# Patient Record
Sex: FEMALE | Race: WHITE | NOT HISPANIC OR LATINO | Employment: OTHER | URBAN - METROPOLITAN AREA
[De-identification: names, ages, dates, MRNs, and addresses within clinical notes are randomized per-mention and may not be internally consistent; named-entity substitution may affect disease eponyms.]

---

## 2017-01-16 ENCOUNTER — ALLSCRIPTS OFFICE VISIT (OUTPATIENT)
Dept: OTHER | Facility: OTHER | Age: 62
End: 2017-01-16

## 2017-04-03 ENCOUNTER — ALLSCRIPTS OFFICE VISIT (OUTPATIENT)
Dept: OTHER | Facility: OTHER | Age: 62
End: 2017-04-03

## 2017-04-07 ENCOUNTER — TRANSCRIBE ORDERS (OUTPATIENT)
Dept: ADMINISTRATIVE | Facility: HOSPITAL | Age: 62
End: 2017-04-07

## 2017-04-07 DIAGNOSIS — Z12.31 ENCOUNTER FOR SCREENING MAMMOGRAM FOR MALIGNANT NEOPLASM OF BREAST: Primary | ICD-10-CM

## 2017-04-24 ENCOUNTER — ALLSCRIPTS OFFICE VISIT (OUTPATIENT)
Dept: OTHER | Facility: OTHER | Age: 62
End: 2017-04-24

## 2017-05-09 ENCOUNTER — HOSPITAL ENCOUNTER (OUTPATIENT)
Dept: MAMMOGRAPHY | Facility: MEDICAL CENTER | Age: 62
Discharge: HOME | End: 2017-05-09
Payer: COMMERCIAL

## 2017-05-09 DIAGNOSIS — Z12.31 ENCOUNTER FOR SCREENING MAMMOGRAM FOR MALIGNANT NEOPLASM OF BREAST: ICD-10-CM

## 2017-05-09 PROCEDURE — G0202 SCR MAMMO BI INCL CAD: HCPCS

## 2017-07-03 ENCOUNTER — ALLSCRIPTS OFFICE VISIT (OUTPATIENT)
Dept: OTHER | Facility: OTHER | Age: 62
End: 2017-07-03

## 2017-08-16 ENCOUNTER — GENERIC CONVERSION - ENCOUNTER (OUTPATIENT)
Dept: OTHER | Facility: OTHER | Age: 62
End: 2017-08-16

## 2017-09-21 ENCOUNTER — ALLSCRIPTS OFFICE VISIT (OUTPATIENT)
Dept: OTHER | Facility: OTHER | Age: 62
End: 2017-09-21

## 2017-09-21 DIAGNOSIS — K21.9 GASTRO-ESOPHAGEAL REFLUX DISEASE WITHOUT ESOPHAGITIS: ICD-10-CM

## 2017-09-21 DIAGNOSIS — Z13.220 ENCOUNTER FOR SCREENING FOR LIPOID DISORDERS: ICD-10-CM

## 2017-09-21 DIAGNOSIS — Z13.29 ENCOUNTER FOR SCREENING FOR OTHER SUSPECTED ENDOCRINE DISORDER: ICD-10-CM

## 2017-09-21 DIAGNOSIS — R10.9 ABDOMINAL PAIN: ICD-10-CM

## 2017-09-21 DIAGNOSIS — K58.9 IRRITABLE BOWEL SYNDROME WITHOUT DIARRHEA: ICD-10-CM

## 2017-09-21 DIAGNOSIS — F41.8 OTHER SPECIFIED ANXIETY DISORDERS: ICD-10-CM

## 2017-09-22 ENCOUNTER — HOSPITAL ENCOUNTER (OUTPATIENT)
Dept: ULTRASOUND IMAGING | Facility: HOSPITAL | Age: 62
Discharge: HOME | End: 2017-09-22
Payer: COMMERCIAL

## 2017-09-22 ENCOUNTER — APPOINTMENT (OUTPATIENT)
Dept: LAB | Facility: CLINIC | Age: 62
End: 2017-09-22
Payer: COMMERCIAL

## 2017-09-22 ENCOUNTER — TRANSCRIBE ORDERS (OUTPATIENT)
Dept: LAB | Facility: CLINIC | Age: 62
End: 2017-09-22

## 2017-09-22 DIAGNOSIS — R10.9 ABDOMINAL PAIN: ICD-10-CM

## 2017-09-22 DIAGNOSIS — Z13.29 ENCOUNTER FOR SCREENING FOR OTHER SUSPECTED ENDOCRINE DISORDER: ICD-10-CM

## 2017-09-22 DIAGNOSIS — F41.8 OTHER SPECIFIED ANXIETY DISORDERS: ICD-10-CM

## 2017-09-22 DIAGNOSIS — K58.9 IRRITABLE BOWEL SYNDROME WITHOUT DIARRHEA: ICD-10-CM

## 2017-09-22 DIAGNOSIS — K21.9 GASTRO-ESOPHAGEAL REFLUX DISEASE WITHOUT ESOPHAGITIS: ICD-10-CM

## 2017-09-22 DIAGNOSIS — Z13.220 ENCOUNTER FOR SCREENING FOR LIPOID DISORDERS: ICD-10-CM

## 2017-09-22 LAB
ALBUMIN SERPL BCP-MCNC: 3.6 G/DL (ref 3.5–5)
ALP SERPL-CCNC: 80 U/L (ref 46–116)
ALT SERPL W P-5'-P-CCNC: 25 U/L (ref 12–78)
AMYLASE SERPL-CCNC: 60 IU/L (ref 25–115)
ANION GAP SERPL CALCULATED.3IONS-SCNC: 7 MMOL/L (ref 4–13)
AST SERPL W P-5'-P-CCNC: 21 U/L (ref 5–45)
BILIRUB SERPL-MCNC: 0.6 MG/DL (ref 0.2–1)
BILIRUB UR QL STRIP: NEGATIVE
BUN SERPL-MCNC: 14 MG/DL (ref 5–25)
CALCIUM SERPL-MCNC: 8.8 MG/DL (ref 8.3–10.1)
CHLORIDE SERPL-SCNC: 102 MMOL/L (ref 100–108)
CHOLEST SERPL-MCNC: 159 MG/DL (ref 50–200)
CLARITY UR: CLEAR
CO2 SERPL-SCNC: 28 MMOL/L (ref 21–32)
COLOR UR: YELLOW
CREAT SERPL-MCNC: 0.82 MG/DL (ref 0.6–1.3)
ERYTHROCYTE [DISTWIDTH] IN BLOOD BY AUTOMATED COUNT: 13.7 % (ref 11.6–15.1)
GFR SERPL CREATININE-BSD FRML MDRD: 77 ML/MIN/1.73SQ M
GLUCOSE P FAST SERPL-MCNC: 96 MG/DL (ref 65–99)
GLUCOSE UR STRIP-MCNC: NEGATIVE MG/DL
HCT VFR BLD AUTO: 41.6 % (ref 34.8–46.1)
HDLC SERPL-MCNC: 71 MG/DL (ref 40–60)
HGB BLD-MCNC: 14 G/DL (ref 11.5–15.4)
HGB UR QL STRIP.AUTO: NEGATIVE
KETONES UR STRIP-MCNC: NEGATIVE MG/DL
LDLC SERPL CALC-MCNC: 66 MG/DL (ref 0–100)
LEUKOCYTE ESTERASE UR QL STRIP: NEGATIVE
LIPASE SERPL-CCNC: 162 U/L (ref 73–393)
MCH RBC QN AUTO: 32.6 PG (ref 26.8–34.3)
MCHC RBC AUTO-ENTMCNC: 33.7 G/DL (ref 31.4–37.4)
MCV RBC AUTO: 97 FL (ref 82–98)
NITRITE UR QL STRIP: NEGATIVE
PH UR STRIP.AUTO: 6.5 [PH] (ref 4.5–8)
PLATELET # BLD AUTO: 277 THOUSANDS/UL (ref 149–390)
PMV BLD AUTO: 10.8 FL (ref 8.9–12.7)
POTASSIUM SERPL-SCNC: 4 MMOL/L (ref 3.5–5.3)
PROT SERPL-MCNC: 7.2 G/DL (ref 6.4–8.2)
PROT UR STRIP-MCNC: NEGATIVE MG/DL
RBC # BLD AUTO: 4.29 MILLION/UL (ref 3.81–5.12)
SODIUM SERPL-SCNC: 137 MMOL/L (ref 136–145)
SP GR UR STRIP.AUTO: 1.01 (ref 1–1.03)
TRIGL SERPL-MCNC: 110 MG/DL
TSH SERPL DL<=0.05 MIU/L-ACNC: 2.21 UIU/ML (ref 0.36–3.74)
UROBILINOGEN UR QL STRIP.AUTO: 0.2 E.U./DL
WBC # BLD AUTO: 6.74 THOUSAND/UL (ref 4.31–10.16)

## 2017-09-22 PROCEDURE — 85027 COMPLETE CBC AUTOMATED: CPT

## 2017-09-22 PROCEDURE — 80053 COMPREHEN METABOLIC PANEL: CPT

## 2017-09-22 PROCEDURE — 81003 URINALYSIS AUTO W/O SCOPE: CPT

## 2017-09-22 PROCEDURE — 76700 US EXAM ABDOM COMPLETE: CPT

## 2017-09-22 PROCEDURE — 82150 ASSAY OF AMYLASE: CPT

## 2017-09-22 PROCEDURE — 36415 COLL VENOUS BLD VENIPUNCTURE: CPT

## 2017-09-22 PROCEDURE — 80061 LIPID PANEL: CPT

## 2017-09-22 PROCEDURE — 84443 ASSAY THYROID STIM HORMONE: CPT

## 2017-09-22 PROCEDURE — 83690 ASSAY OF LIPASE: CPT

## 2017-09-23 ENCOUNTER — GENERIC CONVERSION - ENCOUNTER (OUTPATIENT)
Dept: OTHER | Facility: OTHER | Age: 62
End: 2017-09-23

## 2017-09-25 ENCOUNTER — APPOINTMENT (OUTPATIENT)
Dept: LAB | Facility: CLINIC | Age: 62
End: 2017-09-25
Payer: COMMERCIAL

## 2017-09-25 ENCOUNTER — GENERIC CONVERSION - ENCOUNTER (OUTPATIENT)
Dept: OTHER | Facility: OTHER | Age: 62
End: 2017-09-25

## 2017-09-25 DIAGNOSIS — F41.8 OTHER SPECIFIED ANXIETY DISORDERS: ICD-10-CM

## 2017-09-25 DIAGNOSIS — K21.9 GASTRO-ESOPHAGEAL REFLUX DISEASE WITHOUT ESOPHAGITIS: ICD-10-CM

## 2017-09-25 DIAGNOSIS — K58.9 IRRITABLE BOWEL SYNDROME WITHOUT DIARRHEA: ICD-10-CM

## 2017-09-25 DIAGNOSIS — Z13.29 ENCOUNTER FOR SCREENING FOR OTHER SUSPECTED ENDOCRINE DISORDER: ICD-10-CM

## 2017-09-25 DIAGNOSIS — Z13.220 ENCOUNTER FOR SCREENING FOR LIPOID DISORDERS: ICD-10-CM

## 2017-09-25 DIAGNOSIS — R10.9 ABDOMINAL PAIN: ICD-10-CM

## 2017-09-25 LAB — HEMOCCULT STL QL IA: NEGATIVE

## 2017-09-25 PROCEDURE — G0328 FECAL BLOOD SCRN IMMUNOASSAY: HCPCS

## 2017-09-26 ENCOUNTER — GENERIC CONVERSION - ENCOUNTER (OUTPATIENT)
Dept: OTHER | Facility: OTHER | Age: 62
End: 2017-09-26

## 2017-09-28 ENCOUNTER — GENERIC CONVERSION - ENCOUNTER (OUTPATIENT)
Dept: OTHER | Facility: OTHER | Age: 62
End: 2017-09-28

## 2017-09-30 ENCOUNTER — TRANSCRIBE ORDERS (OUTPATIENT)
Dept: ADMINISTRATIVE | Facility: HOSPITAL | Age: 62
End: 2017-09-30

## 2017-09-30 DIAGNOSIS — R10.9 ABDOMINAL PAIN, UNSPECIFIED SITE: Primary | ICD-10-CM

## 2017-10-01 DIAGNOSIS — K21.9 GASTRO-ESOPHAGEAL REFLUX DISEASE WITHOUT ESOPHAGITIS: ICD-10-CM

## 2017-10-01 DIAGNOSIS — F41.8 OTHER SPECIFIED ANXIETY DISORDERS: ICD-10-CM

## 2017-10-09 ENCOUNTER — ALLSCRIPTS OFFICE VISIT (OUTPATIENT)
Dept: OTHER | Facility: OTHER | Age: 62
End: 2017-10-09

## 2017-10-13 ENCOUNTER — HOSPITAL ENCOUNTER (OUTPATIENT)
Dept: NUCLEAR MEDICINE | Facility: HOSPITAL | Age: 62
Discharge: HOME | End: 2017-10-13
Payer: COMMERCIAL

## 2017-10-13 DIAGNOSIS — R10.9 ABDOMINAL PAIN: ICD-10-CM

## 2017-10-13 PROCEDURE — A9537 TC99M MEBROFENIN: HCPCS

## 2017-10-13 PROCEDURE — 78227 HEPATOBIL SYST IMAGE W/DRUG: CPT

## 2017-10-13 RX ADMIN — SINCALIDE 1.7 MCG: 5 INJECTION, POWDER, LYOPHILIZED, FOR SOLUTION INTRAVENOUS at 09:00

## 2017-10-15 ENCOUNTER — GENERIC CONVERSION - ENCOUNTER (OUTPATIENT)
Dept: OTHER | Facility: OTHER | Age: 62
End: 2017-10-15

## 2017-10-18 ENCOUNTER — GENERIC CONVERSION - ENCOUNTER (OUTPATIENT)
Dept: OTHER | Facility: OTHER | Age: 62
End: 2017-10-18

## 2017-10-25 ENCOUNTER — GENERIC CONVERSION - ENCOUNTER (OUTPATIENT)
Dept: OTHER | Facility: OTHER | Age: 62
End: 2017-10-25

## 2017-10-28 NOTE — PROGRESS NOTES
Assessment    1  Depression with anxiety (300 4) (F41 8)   2  GERD (gastroesophageal reflux disease) (530 81) (K21 9)   3  Abdominal pain (789 00) (R10 9)    Discussion/Summary    #1  Depression with anxiety-improved on citalopram 10 mg 1 tablet daily  Chronic abdominal pain-patient to see Gastroenterology to get an EGD and a colonoscopy done  She has been on long-term omeprazole without much relief  GERD-to see GI to get rid of her abdominal pain  will follow up after she gets her testing done  Possible side effects of new medications were reviewed with the patient/guardian today  The treatment plan was reviewed with the patient/guardian  The patient/guardian understands and agrees with the treatment plan     Self Referrals: No      Chief Complaint  Follow up depression/anxiety, IBS, abdominal painBW results - [facility]      History of Present Illness  This is a 80-year-old female who comes in for her regular check  Since starting citalopram 10 mg 1 daily her anxiety and depression is not bad at all and she does not need a boost in the dosage  Her blood pressure today is 108/70 and her weight is up 1 lb from the previous visit to 184 lb  Reviewing her labs her hemoccults are negative, amylase and lipase are negative, CBC urine and thyroid are all negative  Her cholesterol panel is at goal with the LDL 66 and her glucose is 96  She still has a HIDA scan to undertake  She sees her gastroenterologist for an EGD and colonoscopy in the near future  Review of Systems   Constitutional: No fever, no chills, feels well, no tiredness, no recent weight gain or weight loss  ENT: no complaints of earache, no loss of hearing, no nose bleeds, no nasal discharge, no sore throat, no hoarseness  Cardiovascular: No complaints of slow heart rate, no fast heart rate, no chest pain, no palpitations, no leg claudication, no lower extremity edema    Respiratory: No complaints of shortness of breath, no wheezing, no cough, no SOB on exertion, no orthopnea, no PND  Gastrointestinal: nausea,-- constipation-- and-- Chronic abdominal pain, but-- as noted in HPI,-- no vomiting,-- no diarrhea-- and-- no blood in stools--   The patient presents with complaints of gradual onset of constant episodes of moderate bilateral upper quadrant and bilateral lower quadrant abdominal pain, described as aching, non-radiating  Genitourinary: No complaints of dysuria, no incontinence, no pelvic pain, no dysmenorrhea, no vaginal discharge or bleeding  ROS reviewed  Active Problems  1  Abdominal pain (789 00) (R10 9)   2  Depression screening (V79 0) (Z13 89)   3  Depression with anxiety (300 4) (F41 8)   4  GERD (gastroesophageal reflux disease) (530 81) (K21 9)   5  IBS (irritable bowel syndrome) (564 1) (K58 9)   6  Neck muscle spasm (728 85) (M62 838)   7  Screening for heart disease (V81 2) (Z13 6)   8  Screening for lipid disorders (V77 91) (Z13 220)   9  Screening for thyroid disorder (V77 0) (Z13 29)   10  Vertigo (780 4) (R42)   11  Visit for routine gyn exam (V72 31) (Z01 419)    Past Medical History  1  History of Vitamin D deficiency (268 9) (E55 9)    The active problems and past medical history were reviewed and updated today  Surgical History    1  History of Breast Surgery Reduction Procedure   2  History of Hysterectomy   3  History of Laparoscopic Sling Operation For Stress Incontinence   4  History of Total Knee Replacement    The surgical history was reviewed and updated today  Family History  Mother    1  Family history of arthritis (V17 7) (Z82 61)   2  Family history of diabetes mellitus (V18 0) (Z83 3)   3  Family history of hypertension (V17 49) (Z82 49)   4  Family history of thyroid disease (V18 19) (Z83 49)  Father    5  Family history of arthritis (V17 7) (Z82 61)   6  Family history of lung disease (V19 8) (Z83 6)  Grandparent    7  Family history of malignant neoplasm of stomach (V16 0) (Z80 0)  Grandfather    8  Family history of myocardial infarction (V17 3) (Z82 49)  Family History    9  No family history of mental disorder    The family history was reviewed and updated today  Social History     ·    · Never a smoker   · No drug use   · No secondhand smoke exposure (V49 89) (Z78 9)   · Occasional alcohol use   · Recreation: Crafts   · Recreation: Sewing, needlework  The social history was reviewed and updated today  The social history was reviewed and is unchanged  Current Meds   1  Citalopram Hydrobromide 10 MG Oral Tablet; take 1 tablet by mouth daily; Therapy: 86ZXH3635 to (Evaluate:11Nov2017)  Requested for: 19RSY6821; Last Rx:31Nfb0020 Ordered   2  Magnesium 250 MG Oral Tablet; Therapy: (Recorded:09Oct2017) to Recorded   3  Omeprazole 40 MG Oral Capsule Delayed Release; TAKE 1 CAPSULE BY MOUTH ONCE DAILY Recorded   4  Zostavax 64791 UNT/0 65ML Subcutaneous Solution Reconstituted; Please administer to patient; Therapy: 95YXC4936 to (Last Rx:[id number]) Ordered    The medication list was reviewed and updated today  Allergies  1  Reglan TABS    Vitals  Vital Signs    Recorded: 64BZK9659 04:35PM   Heart Rate 68, L Radial   Pulse Quality Regular, L Radial   Systolic 539, LUE, Sitting   Diastolic 70, LUE, Sitting   Height 5 ft 5 in   Weight 184 lb    BMI Calculated 30 62   BSA Calculated 1 91       Physical Exam   Constitutional  General appearance: No acute distress, well appearing and well nourished  Ears, Nose, Mouth, and Throat  External inspection of ears and nose: Normal    Otoscopic examination: Tympanic membranes translucent with normal light reflex  Canals patent without erythema  Oropharynx: Normal with no erythema, edema, exudate or lesions  Pulmonary  Auscultation of lungs: Clear to auscultation  Cardiovascular  Auscultation of heart: Normal rate and rhythm, normal S1 and S2, without murmurs     Examination of extremities for edema and/or varicosities: Normal    Abdomen  Abdomen: Abnormal  -- Mild tenderness in all quadrants with no guarding or rebound  Liver and spleen: No hepatomegaly or splenomegaly  Lymphatic  Palpation of lymph nodes in neck: No lymphadenopathy  Psychiatric  Orientation to person, place, and time: Normal    Mood and affect: Normal          Signatures   Electronically signed by : Angelique Strickland [facility]; Oct  9 2017  5:00PM EST                       (Author)    Electronically signed by :  JN Mark ; Oct  9 2017  5:29PM EST

## 2017-11-10 ENCOUNTER — LAB REQUISITION (OUTPATIENT)
Dept: LAB | Facility: HOSPITAL | Age: 62
End: 2017-11-10
Payer: COMMERCIAL

## 2017-11-10 ENCOUNTER — ALLSCRIPTS OFFICE VISIT (OUTPATIENT)
Dept: OTHER | Facility: OTHER | Age: 62
End: 2017-11-10

## 2017-11-10 DIAGNOSIS — R10.9 ABDOMINAL PAIN: ICD-10-CM

## 2017-11-10 DIAGNOSIS — R39.15 URGENCY OF URINATION: ICD-10-CM

## 2017-11-10 DIAGNOSIS — R35.0 FREQUENCY OF MICTURITION: ICD-10-CM

## 2017-11-10 LAB
BILIRUB UR QL STRIP: NEGATIVE
CLARITY UR: NORMAL
COLOR UR: YELLOW
GLUCOSE (HISTORICAL): NEGATIVE
HGB UR QL STRIP.AUTO: NEGATIVE
KETONES UR STRIP-MCNC: NEGATIVE MG/DL
LEUKOCYTE ESTERASE UR QL STRIP: NORMAL
NITRITE UR QL STRIP: NEGATIVE
PH UR STRIP.AUTO: 6 [PH]
PROT UR STRIP-MCNC: NEGATIVE MG/DL
SP GR UR STRIP.AUTO: 1.01
UROBILINOGEN UR QL STRIP.AUTO: 0.2

## 2017-11-10 PROCEDURE — 87186 SC STD MICRODIL/AGAR DIL: CPT | Performed by: FAMILY MEDICINE

## 2017-11-10 PROCEDURE — 87077 CULTURE AEROBIC IDENTIFY: CPT | Performed by: FAMILY MEDICINE

## 2017-11-10 PROCEDURE — 87086 URINE CULTURE/COLONY COUNT: CPT | Performed by: FAMILY MEDICINE

## 2017-11-11 NOTE — PROGRESS NOTES
Assessment    1  Burning with urination (788 1) (R30 0)   2  Urinary urgency (788 63) (R39 15)   3  Urinary frequency (788 41) (R35 0)   4  Acute UTI (599 0) (N39 0)    Plan  Abdominal pain, Burning with urination, Urinary frequency, Urinary urgency    · Urine Dip Automated- POC; Status:Complete;   Done: 65SOY9927 10:47AM  Abdominal pain, Urinary frequency, Urinary urgency    · (1) URINE CULTURE; Source:Urine, Clean Catch; Status: In Progress - Specimen/DataCollected;   Done: 10LMI5423  Acute UTI    · Nitrofurantoin Monohyd Macro 100 MG Oral Capsule; TAKE 1 CAPSULE TWICEDAILY WITH MEALS    Discussion/Summary    #1  UTI-patient was placed on Macrobid 100 mg 1 tablet twice a day #20  Her urine was sent for culture  Burning upon urination  Urgency upon urination  Frequency of urination  to get over her infection  patient with results of urine culture  Possible side effects of new medications were reviewed with the patient/guardian today  The treatment plan was reviewed with the patient/guardian  The patient/guardian understands and agrees with the treatment plan     Self Referrals: No      Chief Complaint  Abdominal pain, urinary urgency / frequency that started 2 weeks ago  Notes pain after voiding  States symptoms started within week or two after having had colonoscopy  Also notes she has bladder sling x 6 - 7 years, most recently checked in April of this year  - [facility]      History of Present Illness  HPI: This is a 35-year-old female who comes in with lower abdominal pain, urgency and frequency for the past 2 weeks  She is also experiencing some pain after voiding  She has not noticed any hematuria or back pain  She noticed that the symptoms began approximately 1-2 weeks after her colonoscopy  Her blood pressure today is 110/70 and her temperature is 97 7Â°  Reviewing her urine this morning showed moderate amount of leukocytes and urine will be sent for culture and she will be placed on Macrobid        Review of Systems Constitutional: No fever, no chills, feels well, no tiredness, no recent weight gain or loss  Cardiovascular: no complaints of slow or fast heart rate, no chest pain, no palpitations, no leg claudication or lower extremity edema  Respiratory: no complaints of shortness of breath, no wheezing, no dyspnea on exertion, no orthopnea or PND  Gastrointestinal: no complaints of abdominal pain, no constipation, no nausea or diarrhea, no vomiting, no bloody stools  Genitourinary: as noted in HPI,-- no pelvic pain,-- no vaginal discharge,-- no incontinence,-- no dysmenorrhea-- and-- no unexplained vaginal bleeding--   The patient presents with complaints of gradual onset of intermittent episodes of moderate dysuria  ROS reviewed  Active Problems  1  Abdominal pain (789 00) (R10 9)   2  Depression screening (V79 0) (Z13 89)   3  Depression with anxiety (300 4) (F41 8)   4  GERD (gastroesophageal reflux disease) (530 81) (K21 9)   5  IBS (irritable bowel syndrome) (564 1) (K58 9)   6  Neck muscle spasm (728 85) (M62 838)   7  Screening for heart disease (V81 2) (Z13 6)   8  Screening for lipid disorders (V77 91) (Z13 220)   9  Screening for thyroid disorder (V77 0) (Z13 29)   10  Vertigo (780 4) (R42)   11  Visit for routine gyn exam (V72 31) (Z01 419)    Past Medical History    1  History of Vitamin D deficiency (268 9) (E55 9)  Active Problems And Past Medical History Reviewed: The active problems and past medical history were reviewed and updated today  Family History  Mother    1  Family history of arthritis (V17 7) (Z82 61)   2  Family history of diabetes mellitus (V18 0) (Z83 3)   3  Family history of hypertension (V17 49) (Z82 49)   4  Family history of thyroid disease (V18 19) (Z83 49)  Father    5  Family history of arthritis (V17 7) (Z82 61)   6  Family history of lung disease (V19 8) (Z83 6)  Grandparent    7  Family history of malignant neoplasm of stomach (V16 0) (Z80 0)  Grandfather    8  Family history of myocardial infarction (V17 3) (Z82 49)  Family History    9  No family history of mental disorder  Family History Reviewed: The family history was reviewed and updated today  Social History   ·    · Never a smoker   · No drug use   · No secondhand smoke exposure (V49 89) (Z78 9)   · Occasional alcohol use   · Recreation: Crafts   · Recreation: Sewing, needlework  The social history was reviewed and updated today  The social history was reviewed and is unchanged  Surgical History    1  History of Breast Surgery Reduction Procedure   2  History of Hysterectomy   3  History of Laparoscopic Sling Operation For Stress Incontinence   4  History of Total Knee Replacement  Surgical History Reviewed: The surgical history was reviewed and updated today  Current Meds   1  Citalopram Hydrobromide 10 MG Oral Tablet; take 1 tablet by mouth daily; Therapy: 92SXU2628 to (Evaluate:11Nov2017)  Requested for: 97AXI9512; Last Rx:87Voq3919 Ordered   2  Magnesium 250 MG Oral Tablet; Therapy: (Recorded:09Oct2017) to Recorded   3  Omeprazole 40 MG Oral Capsule Delayed Release; TAKE 1 CAPSULE BY MOUTH ONCE DAILY Recorded   4  Zostavax 99090 UNT/0 65ML Subcutaneous Solution Reconstituted; Please administer to patient; Therapy: 68TXC5751 to (Last Rx:88Pmv8969) Ordered    The medication list was reviewed and updated today  Allergies  1  Reglan TABS    Vitals   Recorded: 79QHF5409 10:34AM   Temperature 97 7 F, Oral   Heart Rate 66, L Radial   Pulse Quality Regular, L Radial   Systolic 229, LUE, Sitting   Diastolic 70, LUE, Sitting   BP CUFF SIZE Large   Height 5 ft 5 in   Weight 186 lb    BMI Calculated 30 95   BSA Calculated 1 92       Physical Exam   Constitutional  General appearance: No acute distress, well appearing and well nourished  Pulmonary  Auscultation of lungs: Clear to auscultation     Cardiovascular  Auscultation of heart: Normal rate and rhythm, normal S1 and S2, without murmurs  Examination of extremities for edema and/or varicosities: Normal    Abdomen  Abdomen: Abnormal  -- Mild tenderness to palpation in the lower abdomen over the bladder with no guarding or rebound  Negative CVA tenderness  Liver and spleen: No hepatomegaly or splenomegaly  Lymphatic  Palpation of lymph nodes in neck: No lymphadenopathy  Results/Data  Urine Dip Automated- POC 05SWX5511 10:47AM Per Avendano     Test Name Result Flag Reference   Color Yellow     Clarity Hazy     Leukocytes Moderate     Nitrite Negative     Blood Negative     Bilirubin Negative     Urobilinogen 0 2     Protein Negative     Ph 6 0     Specific Gravity 1 015     Ketone Negative     Glucose Negative           Signatures   Electronically signed by : Chema Garner, [facility]; Nov 10 2017 11:11AM EST                       (Author)    Electronically signed by :  JN Koroma ; Nov 10 2017 12:49PM EST

## 2017-11-12 LAB — BACTERIA UR CULT: ABNORMAL

## 2017-11-13 ENCOUNTER — GENERIC CONVERSION - ENCOUNTER (OUTPATIENT)
Dept: OTHER | Facility: OTHER | Age: 62
End: 2017-11-13

## 2017-11-27 ENCOUNTER — ALLSCRIPTS OFFICE VISIT (OUTPATIENT)
Dept: OTHER | Facility: OTHER | Age: 62
End: 2017-11-27

## 2017-11-27 ENCOUNTER — GENERIC CONVERSION - ENCOUNTER (OUTPATIENT)
Dept: OTHER | Facility: OTHER | Age: 62
End: 2017-11-27

## 2017-11-27 LAB
BILIRUB UR QL STRIP: NORMAL
CLARITY UR: NORMAL
COLOR UR: YELLOW
GLUCOSE (HISTORICAL): NORMAL
HGB UR QL STRIP.AUTO: NORMAL
KETONES UR STRIP-MCNC: NORMAL MG/DL
LEUKOCYTE ESTERASE UR QL STRIP: NORMAL
NITRITE UR QL STRIP: NORMAL
PH UR STRIP.AUTO: 6.5 [PH]
PROT UR STRIP-MCNC: NORMAL MG/DL
SP GR UR STRIP.AUTO: 1.02
UROBILINOGEN UR QL STRIP.AUTO: 0.2

## 2018-01-11 NOTE — RESULT NOTES
Verified Results  (1) OCCULT BLOOD, FECAL IMMUNOCHEMICAL TEST 45Juy5454 11:30AM Sergey List Order Number: [id number]     Test Name Result Flag Reference   OCCULT BLD, FECAL IMMUNOLOGICAL Negative  Negative   Performed by Fecal Immunochemical Test

## 2018-01-12 VITALS — SYSTOLIC BLOOD PRESSURE: 120 MMHG | WEIGHT: 183.38 LBS | DIASTOLIC BLOOD PRESSURE: 72 MMHG | TEMPERATURE: 97.9 F

## 2018-01-13 VITALS
DIASTOLIC BLOOD PRESSURE: 62 MMHG | HEART RATE: 84 BPM | HEIGHT: 65 IN | BODY MASS INDEX: 30.49 KG/M2 | SYSTOLIC BLOOD PRESSURE: 100 MMHG | WEIGHT: 183 LBS

## 2018-01-13 VITALS
SYSTOLIC BLOOD PRESSURE: 110 MMHG | HEART RATE: 76 BPM | DIASTOLIC BLOOD PRESSURE: 70 MMHG | HEIGHT: 65 IN | TEMPERATURE: 98.5 F | BODY MASS INDEX: 31.93 KG/M2 | WEIGHT: 191.63 LBS

## 2018-01-13 VITALS
SYSTOLIC BLOOD PRESSURE: 110 MMHG | BODY MASS INDEX: 31.82 KG/M2 | WEIGHT: 191 LBS | HEART RATE: 84 BPM | HEIGHT: 65 IN | DIASTOLIC BLOOD PRESSURE: 74 MMHG

## 2018-01-13 VITALS
HEIGHT: 65 IN | TEMPERATURE: 97.7 F | DIASTOLIC BLOOD PRESSURE: 70 MMHG | SYSTOLIC BLOOD PRESSURE: 110 MMHG | BODY MASS INDEX: 30.99 KG/M2 | WEIGHT: 186 LBS | HEART RATE: 66 BPM

## 2018-01-13 NOTE — RESULT NOTES
Verified Results  * NM HEPATOBILIARY W RX [id number] 07:31AM [facility]     Test Name Result Flag Reference   NM HEPATOBILIARY W RX (Report)     HEPATOBILIARY SCAN WITH CHOLECYSTOKININ ADMINISTRATION      INDICATION: Right upper quadrant painR10 9: Unspecified abdominal pain   R10 9: Unspecified abdominal pain  History taken directly from the electronic ordering system  COMPARISON: Ultrasound 9/22/2017     TECHNIQUE:  Following the intravenous administration of 4 8 mCi Tc-99m labeled mebrofenin, dynamic abdominal images were obtained over a 60 minute time period  Images were performed in AP projection  FINDINGS:      There is prompt, uniform accumulation with normal clearance of the radiopharmaceutical by the liver  There is normal filling of the intrahepatic ducts, common bile duct and gallbladder with normal excretion of the radiopharmaceutical into the duodenum  In order to evaluate the contractile response of the gallbladder to cholecystokinin stimulation, 1 7 mcg sincalide (0 02 mcg/kg) was administered by slow intravenous infusion over 60 minutes  These images demonstrate normal contraction of the    gallbladder  The calculated gallbladder ejection fraction is 83 % (N = >35%)  The patient experienced no symptoms after CCK administration  IMPRESSION:     1  Normal hepatobiliary study   2   Normal contractile response of the gallbladder to cholecystokinin infusion  (83%)       Workstation performed: [id number]     Signed by:   Stephanie Dyson MD   10/13/17

## 2018-01-13 NOTE — RESULT NOTES
Message   Recorded as Task   Date: 10/15/2017 10:14 AM, Created By: Yahir Hennessy   Task Name: Call Patient with results   Assigned To: Jasmeet and Guillermina,Clinical Team   Regarding Patient: Mouna Langley, Status: In Progress   Comment:    Yahir Hennessy - 15 Oct 2017 10:14 AM     Patient Phone: [phone]    hida scan wnl if still with sx, refer to gastroenterology   [facility] - 17 Oct 2017 10:27 AM     TASK IN PROGRESS   [facility] - 17 Oct 2017 10:29 AM     TASK EDITED  [facility] for patient for to call for HIDA scan results  Isi Abby - 35 Oct 2017 10:43 AM     TASK EDITED  Patient called for results   Please call her at [phone]   [facility] - 18 Oct 2017 11:37 AM     TASK IN PROGRESS   Kari Harley - 18 Oct 2017 12:19 PM     TASK EDITED  Pt notified of HIDA scan results - states whe already sees gastro, so no referral is needed        Signatures   Electronically signed by : Gonzalo Najera, ; Oct 18 2017 12:21PM EST                       (Author)

## 2018-01-13 NOTE — RESULT NOTES
Verified Results  (1) URINE CULTURE [id number] 10:51AM Araceli Raffi Order Number: [id number]     Test Name Result Flag Reference   CLINICAL REPORT (Report) A    Test:        Urine culture  Specimen Type:   Urine  Specimen Date:   11/10/2017 10:51 AM  Result Date:    11/12/2017 12:33 PM  Result Status:   Final result  Abnormal:      Yes  Resulting Lab:   [facility] [address] [place] [address]            Tel: [phone]      CULTURE                                       ------------------                                   40,000-49,000 cfu/ml Escherichia coli (Abnormal)      SUSCEPTIBILITY                                   ------------------                                                       Escherichia coli  METHOD                 MADELEINE  -------------------------------------  --------------------------  AMPICILLIN ($$)             >16 00 ug/ml Resistant  AMPICILLIN + SULBACTAM ($)       16/8 ug/ml  Intermediate  AZTREONAM ($$$)             <=8 ug/ml   Susceptible  CEFAZOLIN ($)              <=8 00 ug/ml Susceptible  CIPROFLOXACIN ($)            <=1 00 ug/ml Susceptible  GENTAMICIN ($$)             <=4 ug/ml   Susceptible  LEVOFLOXACIN ($)            <=2 00 ug/ml Susceptible  NITROFURANTOIN             <=32 ug/ml  Susceptible  PIPERACILLIN + TAZOBACTAM ($$$)     <=16 ug/ml  Susceptible  TETRACYCLINE              <=4 ug/ml   Susceptible  TOBRAMYCIN ($)             <=4 ug/ml   Susceptible  TRIMETHOPRIM + SULFAMETHOXAZOLE ($$$)  <=2/38 ug/ml Susceptible

## 2018-01-14 VITALS
DIASTOLIC BLOOD PRESSURE: 70 MMHG | HEART RATE: 68 BPM | BODY MASS INDEX: 30.66 KG/M2 | HEIGHT: 65 IN | WEIGHT: 184 LBS | SYSTOLIC BLOOD PRESSURE: 108 MMHG

## 2018-01-16 NOTE — RESULT NOTES
Verified Results  Urine Dip Automated- POC [id number] 10:15AM Reklaw Chary     Test Name Result Flag Reference   Color Yellow     Clarity Transparent     Leukocytes neg     Nitrite neg     Blood neg     Bilirubin neg     Urobilinogen 0 2     Protein neg     Ph 6 5     Specific Gravity 1 020     Ketone neg     Glucose neg     Color Yellow     Clarity Transparent     Leukocytes neg     Nitrite neg     Blood neg     Bilirubin neg     Urobilinogen 0 2     Protein neg     Ph 6 5     Specific Gravity 1 020     Ketone neg     Glucose neg

## 2018-01-16 NOTE — RESULT NOTES
Verified Results  * 58 Samira Longoria [id number] 02:37PM Loralie Meigs Order Number: [id number]    - Patient Instructions: To schedule this appointment, please contact Central Scheduling at [phone]  Test Name Result Flag Reference   US ABDOMEN COMPLETE (Report)     ABDOMEN ULTRASOUND, COMPLETE     INDICATION: Abdominal pain  COMPARISON: None  TECHNIQUE:  Real-time ultrasound of the abdomen was performed with a curvilinear transducer with both volumetric sweeps and still imaging techniques  FINDINGS:     PANCREAS: Visualized portions show no abnormality  AORTA AND IVC: Visualized portions are normal for patient age  LIVER:   Normal size  Echogenicity and contour are normal    No evidence of mass  Normal directional flow is present within the portal vein  BILIARY:   The gallbladder is normal in caliber  No wall thickening or pericholecystic fluid  No stones or sludge  No sonographic Gómez's sign  No intrahepatic biliary dilatation  CBD measures 4 mm  No choledocholithiasis  KIDNEYS:    Right kidney measures 10 3 x 4 5 cm  Within normal limits  Left kidney measures 9 7 x 5 2 cm  Within normal limits  SPLEEN:    Within normal limits  ASCITES: None  IMPRESSION:   Normal        Workstation performed: [id number]     Signed by:    Hallie Simon MD   9/22/17

## 2018-01-17 NOTE — RESULT NOTES
Verified Results  (1) AMYLASE [id number] 07:02AM Yahir Hennessy     Test Name Result Flag Reference   AMYLASE 60 IU/L       (1) CBC/ PLT (NO DIFF) [id number] 07:02AM Yahir Hennessy     Test Name Result Flag Reference   HEMATOCRIT 41 6 %  34 8-46 1   HEMOGLOBIN 14 0 g/dL  11 5-15 4   MCHC 33 7 g/dL  31 4-37 4   MCH 32 6 pg  26 8-34 3   MCV 97 fL  82-98   PLATELET COUNT 614 Thousands/uL  149-390   RBC COUNT 4 29 Million/uL  3 81-5 12   RDW 13 7 %  11 6-15 1   WBC COUNT 6 74 Thousand/uL  4 31-10 16   MPV 10 8 fL  8 9-12 7     (1) COMPREHENSIVE METABOLIC PANEL [id number] 71:01JH Yahir Hennesys     Test Name Result Flag Reference   SODIUM 137 mmol/L  136-145   POTASSIUM 4 0 mmol/L  3 5-5 3   CHLORIDE 102 mmol/L  100-108   CARBON DIOXIDE 28 mmol/L  21-32   ANION GAP (CALC) 7 mmol/L  4-13   BLOOD UREA NITROGEN 14 mg/dL  5-25   CREATININE 0 82 mg/dL  0 60-1 30   Standardized to IDMS reference method   CALCIUM 8 8 mg/dL  8 3-10 1   BILI, TOTAL 0 60 mg/dL  0 20-1 00   ALK PHOSPHATAS 80 U/L     ALT (SGPT) 25 U/L  12-78   Specimen collection should occur prior to Sulfasalazine and/or Sulfapyridine administration due to the potential for falsely depressed results  AST(SGOT) 21 U/L  5-45   Specimen collection should occur prior to Sulfasalazine administration due to the potential for falsely depressed results  ALBUMIN 3 6 g/dL  3 5-5 0   TOTAL PROTEIN 7 2 g/dL  6 4-8 2   eGFR 77 ml/min/1 73sq m     [facility] Disease Education Program recommendations are as follows:  GFR calculation is accurate only with a steady state creatinine  Chronic Kidney disease less than 60 ml/min/1 73 sq  meters  Kidney failure less than 15 ml/min/1 73 sq  meters  GLUCOSE FASTING 96 mg/dL  65-99   Specimen collection should occur prior to Sulfasalazine administration due to the potential for falsely depressed results   Specimen collection should occur prior to Sulfapyridine administration due to the potential for falsely elevated results  (1) LIPASE 12Wle2452 07:02AM Kirk Hennessy     Test Name Result Flag Reference   LIPASE 162 u/L       (1) LIPID PANEL, FASTING [id number] 07:02AM Yahir Hennessy     Test Name Result Flag Reference   CHOLESTEROL 159 mg/dL     HDL,DIRECT 71 mg/dL H 40-60   Specimen collection should occur prior to Metamizole administration due to the potential for falsley depressed results  LDL CHOLESTEROL CALCULATED 66 mg/dL  0-100   Triglyceride:        Normal <150 mg/dl   Borderline High 150-199 mg/dl   High 200-499 mg/dl   Very High >499 mg/dl      Cholesterol:       Desirable <200 mg/dl    Borderline High 200-239 mg/dl    High >239 mg/dl      HDL Cholesterol:       High>59 mg/dL    Low <41 mg/dL      This screening LDL is a calculated result  It does not have the accuracy of the Direct Measured LDL in the monitoring of patients with hyperlipidemia and/or statin therapy  Direct Measure LDL (ZGD769) must be ordered separately in these patients  TRIGLYCERIDES 110 mg/dL  <=150   Specimen collection should occur prior to N-Acetylcysteine or Metamizole administration due to the potential for falsely depressed results  (1) TSH [id number] 07:02AM Yahir Hennessy     Test Name Result Flag Reference   TSH 2 210 uIU/mL  0 358-3 740   Patients undergoing fluorescein dye angiography may retain small amounts of fluorescein in the body for 48-72 hours post procedure  Samples containing fluorescein can produce falsely depressed TSH values  If the patient had this procedure,a specimen should be resubmitted post fluorescein clearance            The recommended reference ranges for TSH during pregnancy are as follows:  First trimester 0 1 to 2 5 uIU/mL  Second trimester  0 2 to 3 0 uIU/mL  Third trimester 0 3 to 3 0 uIU/m     (1) URINALYSIS (will reflex a microscopy if leukocytes, occult blood, protein or nitrites are not within normal limits) [id number] 07:02AM Kirk Hennessy Test Name Result Flag Reference   COLOR Yellow     CLARITY Clear     SPECIFIC GRAVITY UA 1 012  1 003-1 030   PH UA 6 5  4 5-8 0   LEUKOCYTE ESTERASE UA Negative  Negative   NITRITE UA Negative  Negative   PROTEIN UA Negative mg/dl  Negative   GLUCOSE UA Negative mg/dl  Negative   KETONES UA Negative mg/dl  Negative   UROBILINOGEN UA 0 2 E U /dl  0 2, 1 0 E U /dl   BILIRUBIN UA Negative  Negative   BLOOD UA Negative  Negative

## 2018-01-18 NOTE — PSYCH
History of Present Illness  Psychotherapy Provided [facility]: Individual Psychotherapy 30 minutes minutes provided today  Goals addressed in session:   Patient struggling with stress at work  Has transitioned into secondary mood issue  Does nit share low work ethic like her coworkers and this has created a great deal of tension and frustration,  Transitioning into home environment  Has not been walking/exercising or doing what she should in free time to counteract stress  Patient verbal and cooperative  HPI - Psych: Patient has history of mood and anxiety issued and has been in counseling previously  Has struggled with aggressiveness as opposed to assertiveness in workplace  Has had difficult time as she gets closer to snf age  Denies any SI   Note   Note:   Reviewed stress mgmt as well as assertiveness strategies to apply in workplace  Give handout on managing stress/worry  Provided my contact information and offered additional sessions or referral for more intensive services  Assessment    1   Depression with anxiety (300 4) (F41 8)    Signatures   Electronically signed by : Danay Soto LCSW; Apr 25 2017 12:56PM EST                       (Author)

## 2018-01-22 VITALS
SYSTOLIC BLOOD PRESSURE: 120 MMHG | HEIGHT: 65 IN | WEIGHT: 183.5 LBS | RESPIRATION RATE: 12 BRPM | BODY MASS INDEX: 30.57 KG/M2 | DIASTOLIC BLOOD PRESSURE: 68 MMHG | HEART RATE: 68 BPM

## 2018-04-30 ENCOUNTER — OFFICE VISIT (OUTPATIENT)
Dept: FAMILY MEDICINE CLINIC | Facility: CLINIC | Age: 63
End: 2018-04-30
Payer: COMMERCIAL

## 2018-04-30 VITALS
HEART RATE: 72 BPM | DIASTOLIC BLOOD PRESSURE: 80 MMHG | HEIGHT: 66 IN | SYSTOLIC BLOOD PRESSURE: 120 MMHG | WEIGHT: 192.6 LBS | BODY MASS INDEX: 30.95 KG/M2

## 2018-04-30 DIAGNOSIS — T16.2XXA FOREIGN BODY OF LEFT EAR, INITIAL ENCOUNTER: Primary | ICD-10-CM

## 2018-04-30 PROCEDURE — 99212 OFFICE O/P EST SF 10 MIN: CPT | Performed by: PHYSICIAN ASSISTANT

## 2018-04-30 PROCEDURE — 3008F BODY MASS INDEX DOCD: CPT | Performed by: PHYSICIAN ASSISTANT

## 2018-04-30 RX ORDER — OMEPRAZOLE 20 MG/1
20 CAPSULE, DELAYED RELEASE ORAL
COMMUNITY
Start: 2012-12-13

## 2018-04-30 NOTE — PROGRESS NOTES
Assessment/Plan:  Patient Instructions   1  Foreign body left ear-foreign body was easily retrieved with forceps without complication  No problem-specific Assessment & Plan notes found for this encounter  Diagnoses and all orders for this visit:    Foreign body of left ear, initial encounter    Other orders  -     omeprazole (PRILOSEC) 20 mg delayed release capsule; Take 20 mg by mouth          Subjective:      Patient ID: Nicole Luis is a 58 y o  female  Chief complaint:  Pt is here because she states that the ear bud from her left hearing aid is in her ear   ~cd    HPI:  Part of hearing aids stocking ear earlier today          The following portions of the patient's history were reviewed and updated as appropriate: allergies, current medications, past family history, past medical history, past social history, past surgical history and problem list     Review of Systems   HENT:        "Something in my ear"         Objective:  Vitals:    04/30/18 1806   BP: 120/80   BP Location: Left arm   Patient Position: Sitting   Cuff Size: Large   Pulse: 72   Weight: 87 4 kg (192 lb 9 6 oz)   Height: 5' 6" (1 676 m)                Physical Exam   HENT:   Left ear with small black foam ear blood from hearing aid obstructing canal

## 2018-08-15 ENCOUNTER — TRANSCRIBE ORDERS (OUTPATIENT)
Dept: ADMINISTRATIVE | Facility: HOSPITAL | Age: 63
End: 2018-08-15

## 2018-08-15 DIAGNOSIS — Z12.39 SCREENING BREAST EXAMINATION: Primary | ICD-10-CM

## 2018-08-16 ENCOUNTER — HOSPITAL ENCOUNTER (OUTPATIENT)
Dept: MAMMOGRAPHY | Facility: MEDICAL CENTER | Age: 63
Discharge: HOME | End: 2018-08-16
Payer: COMMERCIAL

## 2018-08-16 DIAGNOSIS — Z12.39 SCREENING BREAST EXAMINATION: ICD-10-CM

## 2018-08-16 PROCEDURE — 77067 SCR MAMMO BI INCL CAD: CPT

## 2018-08-16 PROCEDURE — 77063 BREAST TOMOSYNTHESIS BI: CPT

## 2018-10-23 ENCOUNTER — OFFICE VISIT (OUTPATIENT)
Dept: FAMILY MEDICINE CLINIC | Facility: CLINIC | Age: 63
End: 2018-10-23
Payer: COMMERCIAL

## 2018-10-23 ENCOUNTER — HOSPITAL ENCOUNTER (OUTPATIENT)
Dept: NON INVASIVE DIAGNOSTICS | Facility: HOSPITAL | Age: 63
Discharge: HOME | End: 2018-10-23
Payer: COMMERCIAL

## 2018-10-23 VITALS
HEART RATE: 80 BPM | DIASTOLIC BLOOD PRESSURE: 66 MMHG | WEIGHT: 190 LBS | SYSTOLIC BLOOD PRESSURE: 118 MMHG | BODY MASS INDEX: 30.67 KG/M2

## 2018-10-23 DIAGNOSIS — M25.512 ACUTE PAIN OF LEFT SHOULDER: Primary | ICD-10-CM

## 2018-10-23 DIAGNOSIS — K21.9 GASTROESOPHAGEAL REFLUX DISEASE, ESOPHAGITIS PRESENCE NOT SPECIFIED: ICD-10-CM

## 2018-10-23 DIAGNOSIS — E78.2 MIXED HYPERLIPIDEMIA: ICD-10-CM

## 2018-10-23 DIAGNOSIS — M79.602 LEFT ARM PAIN: ICD-10-CM

## 2018-10-23 DIAGNOSIS — M25.512 ACUTE PAIN OF LEFT SHOULDER: ICD-10-CM

## 2018-10-23 PROBLEM — K58.9 IBS (IRRITABLE BOWEL SYNDROME): Status: ACTIVE | Noted: 2017-09-21

## 2018-10-23 PROBLEM — F41.8 DEPRESSION WITH ANXIETY: Status: ACTIVE | Noted: 2017-04-03

## 2018-10-23 PROBLEM — M21.619 HALLUX VALGUS WITH BUNIONS: Status: ACTIVE | Noted: 2018-10-23

## 2018-10-23 PROBLEM — M20.60 ACQUIRED DEFORMITY OF JOINT OF BIG TOE: Status: ACTIVE | Noted: 2018-10-23

## 2018-10-23 PROBLEM — M20.10 HALLUX VALGUS WITH BUNIONS: Status: ACTIVE | Noted: 2018-10-23

## 2018-10-23 PROCEDURE — 1036F TOBACCO NON-USER: CPT | Performed by: FAMILY MEDICINE

## 2018-10-23 PROCEDURE — 93971 EXTREMITY STUDY: CPT | Performed by: SURGERY

## 2018-10-23 PROCEDURE — 93971 EXTREMITY STUDY: CPT

## 2018-10-23 PROCEDURE — 99214 OFFICE O/P EST MOD 30 MIN: CPT | Performed by: FAMILY MEDICINE

## 2018-10-23 RX ORDER — MECLIZINE HYDROCHLORIDE 25 MG/1
TABLET ORAL
COMMUNITY

## 2018-10-23 RX ORDER — MELOXICAM 15 MG/1
15 TABLET ORAL DAILY
Qty: 20 TABLET | Refills: 0 | Status: SHIPPED | OUTPATIENT
Start: 2018-10-23 | End: 2019-01-25

## 2018-10-23 NOTE — PATIENT INSTRUCTIONS
Tendinitis   WHAT YOU NEED TO KNOW:   What is tendinitis? Tendinitis is painful inflammation or breakdown of your tendons  It may also be called tendinopathy  Tendinitis often occurs in the knee, shoulder, ankle, hip, or elbow  What increases my risk for tendinitis? · Injury, overuse, or repeated movement of a joint     · Not warming up before exercise, or not resting enough between activities    · Use of shoes or exercise equipment that do not fit well    · Muscle weakness, balance problems, or poor posture  What are the signs and symptoms of tendinitis? You may have redness, pain, or swelling around your joint, tendon, or muscle  You may also have pain, stiffness, or decreased movement in your joint  How is tendinitis diagnosed? Your healthcare provider will check your range of motion of the affected joint  He may also lightly press on your tendon to check for pain  You may also need an ultrasound, x-ray, or MRI to show if you have tendinitis or another condition  How is tendinitis treated? · Pain medicines  such as NSAIDs and acetaminophen may decrease swelling and pain  These medicines are available without a doctor's order  Ask how much to take and when to take it  Follow directions  NSAIDs and acetaminophen may cause liver or kidney damage if not taken correctly  · Steroids  may be used to decrease pain and swelling  They may be given as a pill or as an injection into the affected area  Steroids are rarely used in children  · Surgery  may rarely be needed if other treatment does not work  How can I manage my symptoms? · Rest  your tendon as directed to help it heal  Ask your healthcare provider if you need to stop putting weight on your affected area  · Ice  helps decrease swelling and pain, and may help prevent tissue damage  Use an ice pack, or put crushed ice in a plastic bag   Cover it with a towel and place it on the affected area for 10 to 15 minutes every hour or as directed  · Support devices  such as a cane, splint, shoe insert, or brace may help reduce your pain  · Physical therapy  may be ordered by your healthcare provider  This may be used to teach you exercises to help improve movement and strength, and to decrease pain  You may also learn how to improve your posture, and how to lift or exercise correctly  How can I prevent tendinitis? · Warm up or stretch  before you exercise  · Exercise regularly  to strengthen the muscles around your joint  Ease into an exercise routine for the first 3 weeks to prevent another injury  Ask your healthcare provider about the best exercise plan for you  Rest fully between activities  · Use the right equipment  for sports and exercise  Wear braces or tape around weak joints as directed  When should I contact my healthcare provider? · You have increased pain even after you take medicine  · You have questions or concerns about your condition or care  When should I seek immediate help? · You have increased redness over the joint, or swelling in the joint  · You suddenly cannot move your joint  CARE AGREEMENT:   You have the right to help plan your care  Learn about your health condition and how it may be treated  Discuss treatment options with your caregivers to decide what care you want to receive  You always have the right to refuse treatment  The above information is an  only  It is not intended as medical advice for individual conditions or treatments  Talk to your doctor, nurse or pharmacist before following any medical regimen to see if it is safe and effective for you  © 2017 [employer]  Information is for End User's use only and may not be sold, redistributed or otherwise used for commercial purposes  All illustrations and images included in CareNotes® are the copyrighted property of [employer]  or [employer]    Rotator Cuff Tendinitis   WHAT YOU NEED TO KNOW: Rotator cuff tendinitis is inflammation of the tendons in your shoulder joint  A tendon is a cord of tough tissue that connects your muscles to your bones  The rotator cuff is made up of a group of muscles and tendons that hold the shoulder joint in place  DISCHARGE INSTRUCTIONS:   Medicines:   · NSAIDs:  These medicines decrease swelling and pain  NSAIDs are available without a doctor's order  Ask your healthcare provider which medicine is right for you  Ask how much to take and when to take it  Take as directed  NSAIDs can cause stomach bleeding or kidney problems if not taken correctly  · Steroids: This medicine may be injected into the rotator cuff area to decrease inflammation and pain  · Take your medicine as directed  Contact your healthcare provider if you think your medicine is not helping or if you have side effects  Tell him or her if you are allergic to any medicine  Keep a list of the medicines, vitamins, and herbs you take  Include the amounts, and when and why you take them  Bring the list or the pill bottles to follow-up visits  Carry your medicine list with you in case of an emergency  Follow up with your healthcare provider or orthopedist as directed:  Write down your questions so you remember to ask them during your visits  Self-care:   · Rest:  Limit activity on your affected shoulder to decrease stress on the tendon  This may help prevent further damage, decrease pain, and promote healing  · Ice:  Ice helps decrease swelling and pain  Ice may also help prevent tissue damage  Use an ice pack, or put crushed ice in a plastic bag  Cover it with a towel and place it on your shoulder for 15 to 20 minutes every hour or as directed  · Shoulder position:  Keep your shoulder in the correct position so it will heal faster  This may be done by increasing the height of armrests while you work, drive, and sit  Try not to sleep on the side of your injured shoulder   If you are a woman, wear a sports bra so that the straps are closer to your neck  This may help decrease stress in the affected shoulder  Physical therapy:  A physical therapist can teach you exercises to help improve movement and strength, and to decrease pain  The exercises may help you move your shoulder normally again and strengthen your rotator cuff  You may also learn other exercises, such as stretching and strengthening of your shoulder muscles  You may learn changes to make to your daily activities that will help decrease stress on your tendons  Contact your healthcare provider or orthopedist if:   · You have a fever  · You have pain and swelling in your shoulder even after you take pain medicine  · Your skin is itchy, swollen, or has a rash  · Your symptoms are not getting better or are getting worse  · You have questions or concerns about your condition or care  Return to the emergency department if:   · You have sudden shortness of breath or chest pain  · Any part of your arm is numb, tingly, cold, blue, or pale  © 2017 [employer] Information is for End User's use only and may not be sold, redistributed or otherwise used for commercial purposes  All illustrations and images included in CareNotes® are the copyrighted property of [employer]  or [employer]  The above information is an  only  It is not intended as medical advice for individual conditions or treatments  Talk to your doctor, nurse or pharmacist before following any medical regimen to see if it is safe and effective for you

## 2018-10-23 NOTE — PROGRESS NOTES
Assessment/Plan:    Acute pain of left shoulder  Most likely tendinitis  Need to rule out DVT, follow up on ultrasound stat  Advised on continue with icing, meloxicam was given to the patient to take if negative for DVT  To continue with icing  To return to the office if not better to consider physical therapy and Ortho referral        Diagnoses and all orders for this visit:    Acute pain of left shoulder  -     VAS upper limb venous duplex scan, unilateral/limited; Future  -     meloxicam (MOBIC) 15 mg tablet; Take 1 tablet (15 mg total) by mouth daily    Left arm pain  -     VAS upper limb venous duplex scan, unilateral/limited; Future  -     meloxicam (MOBIC) 15 mg tablet; Take 1 tablet (15 mg total) by mouth daily    Mixed hyperlipidemia    Gastroesophageal reflux disease, esophagitis presence not specified    Other orders  -     meclizine (ANTIVERT) 25 mg tablet; TAKE 1 TABLET (25 MG TOTAL) BY MOUTH 3 (THREE) TIMES A DAY AS NEEDED FOR DIZZINESS  Subjective: patient c/o left shoulder pain and weakness, soreness  between the shoulder and elbow x 3 weeks  Patient states she got her flu shot just prior to this happening  Patient has been icing the upper arm nightly and taking Tylenol with some relief  No CP, SOB, etc  ak     Patient ID: Niranjan Bai is a 58 y o  female  Left upper arm/shoulder pain that started 3 weeks ago, on the left shoulder then radiate down to the arm until the elbow, no swelling, patient received a flu shot on her left upper arm just before her symptoms started and then she had bruising black and blue marking that resolved completely lately, and then patient feel pain on the left upper arm that is constant, limiting her range of movement she feel weak on that arm denied any numbness and tingling, denied any chest pain or any neck pain, the pain is 4/10 sometimes 8/10 no specific aggravating or relieving factor patient use Tylenol with some relief and used icing          The following portions of the patient's history were reviewed and updated as appropriate: allergies, current medications, past family history, past medical history, past social history, past surgical history and problem list     Review of Systems   Constitutional: Negative for activity change, appetite change, chills, diaphoresis, fatigue and fever  HENT: Negative for congestion, postnasal drip, sinus pressure, sore throat and voice change  Eyes: Negative for pain, redness and visual disturbance  Respiratory: Negative for cough, chest tightness, shortness of breath and wheezing  Cardiovascular: Negative for chest pain, palpitations and leg swelling  Gastrointestinal: Negative for abdominal pain, constipation, diarrhea and nausea  Endocrine: Negative for cold intolerance, heat intolerance and polyuria  Genitourinary: Negative for dysuria, enuresis, flank pain and frequency  Musculoskeletal: Positive for arthralgias  Negative for back pain, gait problem, joint swelling, neck pain and neck stiffness  Skin: Negative for color change and wound  Neurological: Negative for dizziness, syncope, speech difficulty, numbness and headaches  Psychiatric/Behavioral: Negative for behavioral problems and confusion  The patient is not nervous/anxious  Objective:      /66   Pulse 80   Wt 86 2 kg (190 lb)   BMI 30 67 kg/m²          Physical Exam   Constitutional: She is oriented to person, place, and time  She appears well-developed and well-nourished  HENT:   Head: Normocephalic and atraumatic  Eyes: Pupils are equal, round, and reactive to light  Conjunctivae and EOM are normal    Neck: Normal range of motion  Neck supple  Cardiovascular: Normal rate, regular rhythm, normal heart sounds and intact distal pulses  Pulmonary/Chest: Effort normal and breath sounds normal    Abdominal: Soft  Bowel sounds are normal    Musculoskeletal: Normal range of motion          Arms:  Left shoulder with full range of movement, there is tenderness on the lateral upper arm area  No swelling, no warmth, no tenderness on the left arm  Neurological: She is alert and oriented to person, place, and time  She has normal reflexes  Skin: Skin is warm and dry  Psychiatric: She has a normal mood and affect  Her behavior is normal    Nursing note and vitals reviewed

## 2018-10-23 NOTE — ASSESSMENT & PLAN NOTE
Most likely tendinitis  Need to rule out DVT, follow up on ultrasound stat  Advised on continue with icing, meloxicam was given to the patient to take if negative for DVT    To continue with icing  To return to the office if not better to consider physical therapy and Ortho referral

## 2019-01-25 ENCOUNTER — OFFICE VISIT (OUTPATIENT)
Dept: FAMILY MEDICINE CLINIC | Facility: CLINIC | Age: 64
End: 2019-01-25
Payer: COMMERCIAL

## 2019-01-25 VITALS
SYSTOLIC BLOOD PRESSURE: 118 MMHG | HEIGHT: 65 IN | TEMPERATURE: 98.5 F | BODY MASS INDEX: 31.65 KG/M2 | WEIGHT: 190 LBS | DIASTOLIC BLOOD PRESSURE: 82 MMHG

## 2019-01-25 DIAGNOSIS — R05.9 COUGH: ICD-10-CM

## 2019-01-25 DIAGNOSIS — L04.0 ACUTE CERVICAL ADENITIS: ICD-10-CM

## 2019-01-25 DIAGNOSIS — E66.9 CLASS 1 OBESITY WITHOUT SERIOUS COMORBIDITY WITH BODY MASS INDEX (BMI) OF 31.0 TO 31.9 IN ADULT, UNSPECIFIED OBESITY TYPE: ICD-10-CM

## 2019-01-25 DIAGNOSIS — J30.9 ALLERGIC RHINITIS, UNSPECIFIED SEASONALITY, UNSPECIFIED TRIGGER: ICD-10-CM

## 2019-01-25 DIAGNOSIS — J01.40 ACUTE NON-RECURRENT PANSINUSITIS: Primary | ICD-10-CM

## 2019-01-25 PROCEDURE — 99214 OFFICE O/P EST MOD 30 MIN: CPT | Performed by: FAMILY MEDICINE

## 2019-01-25 PROCEDURE — 3008F BODY MASS INDEX DOCD: CPT | Performed by: FAMILY MEDICINE

## 2019-01-25 PROCEDURE — 1036F TOBACCO NON-USER: CPT | Performed by: FAMILY MEDICINE

## 2019-01-25 RX ORDER — BROMPHENIRAMINE MALEATE, PSEUDOEPHEDRINE HYDROCHLORIDE, AND DEXTROMETHORPHAN HYDROBROMIDE 2; 30; 10 MG/5ML; MG/5ML; MG/5ML
5 SYRUP ORAL 4 TIMES DAILY PRN
Qty: 120 ML | Refills: 1 | Status: SHIPPED | OUTPATIENT
Start: 2019-01-25 | End: 2019-02-04

## 2019-01-25 RX ORDER — IPRATROPIUM BROMIDE 21 UG/1
SPRAY, METERED NASAL
Qty: 30 ML | Refills: 3 | Status: SHIPPED | OUTPATIENT
Start: 2019-01-25

## 2019-01-25 RX ORDER — AZITHROMYCIN 250 MG/1
TABLET, FILM COATED ORAL
Qty: 6 TABLET | Refills: 0 | Status: SHIPPED | OUTPATIENT
Start: 2019-01-25 | End: 2019-01-30

## 2019-01-25 NOTE — PROGRESS NOTES
Assessment/Plan:  1  Acute sinusitis 2  Acute cervical adenitis, Z-Flakito prescribed  3  Allergic rhinitis, Atrovent nasal spray was ordered  4  Cough  Bromfed was ordered drowsiness discussed  5  Obesity BMI is 31 preferred is 25 diet exercise recommended  Allergic rhinitis  Atrovent nasal spray as ordered    Obesity  BMI discussed       Diagnoses and all orders for this visit:    Acute non-recurrent pansinusitis  -     azithromycin (ZITHROMAX) 250 mg tablet; Take 2 tablets today then 1 tablet daily till finished    Acute cervical adenitis  -     azithromycin (ZITHROMAX) 250 mg tablet; Take 2 tablets today then 1 tablet daily till finished    Cough  -     brompheniramine-pseudoephedrine-DM 30-2-10 MG/5ML syrup; Take 5 mL by mouth 4 (four) times a day as needed for cough or allergies for up to 10 days    Allergic rhinitis, unspecified seasonality, unspecified trigger  -     ipratropium (ATROVENT) 0 03 % nasal spray; Use 2 sprays both nostrils 3 times a day as needed    Class 1 obesity without serious comorbidity with body mass index (BMI) of 31 0 to 31 9 in adult, unspecified obesity type          Subjective: Pt c/o productive cough,sorethroat, blocked ears,feeling fatigue  Patient ID: Marychuy Chua is a 61 y o  female  For the past 4-5 days patient is having chills no fever eye head congestion sinus pain sneezing PRA postnasal drip mild to moderate dry cough  No chest pain shortness with wheeze or hemoptysis  No myalgias or arthralgias  No medication has been used        The following portions of the patient's history were reviewed and updated as appropriate: allergies, current medications, past family history, past medical history, past social history, past surgical history and problem list     Review of Systems   Constitutional:        HPI   HENT:        HPI   Eyes: Negative  Respiratory:        HPI   Cardiovascular: Negative for chest pain  Gastrointestinal: Negative  Endocrine: Negative  Genitourinary: Negative  Musculoskeletal: Negative  Skin: Negative  Allergic/Immunologic: Positive for environmental allergies  Neurological: Negative  Hematological: Negative  Psychiatric/Behavioral: Negative  Objective:      /82   Temp 98 5 °F (36 9 °C)   Ht 5' 5" (1 651 m)   Wt 86 2 kg (190 lb)   BMI 31 62 kg/m²          Physical Exam   Constitutional: She is oriented to person, place, and time  She appears well-developed and well-nourished  HENT:   Head: Normocephalic and atraumatic  Mouth/Throat: No oropharyngeal exudate  Both tympanic membranes are dull no injection no fluid positive allergic turbinates positive pansinus tenderness to percussion positive purulent postnasal drip minimal pharyngeal injection negative exudate   Eyes: Pupils are equal, round, and reactive to light  Conjunctivae and EOM are normal  No scleral icterus  Neck: Neck supple  No thyromegaly present  Anterior nodes are enlarged and tender   Cardiovascular: Normal rate, regular rhythm and normal heart sounds  Pulmonary/Chest: Effort normal and breath sounds normal    Abdominal: Soft  Bowel sounds are normal  There is no tenderness  Musculoskeletal: She exhibits no edema  Lymphadenopathy:     She has cervical adenopathy  Neurological: She is alert and oriented to person, place, and time  A cranial nerve deficit is present  Patient was bilateral hearing aids otherwise cranial nerves are intact   Skin: Skin is warm and dry  Psychiatric: She has a normal mood and affect  BMI Counseling: Body mass index is 31 62 kg/m²  Discussed the patient's BMI with her  The BMI is above average  BMI counseling and education was provided to the patient  Nutrition recommendations include decreasing overall calorie intake  Exercise recommendations include exercising 3-5 times per week

## 2019-01-25 NOTE — PATIENT INSTRUCTIONS
Return in 1 week if still symptoms  BMI is 31 preferred is under 25    Decrease caloric intake increase exercise

## 2019-07-09 ENCOUNTER — OFFICE VISIT (OUTPATIENT)
Dept: FAMILY MEDICINE CLINIC | Facility: CLINIC | Age: 64
End: 2019-07-09
Payer: COMMERCIAL

## 2019-07-09 VITALS
HEART RATE: 88 BPM | BODY MASS INDEX: 31.56 KG/M2 | DIASTOLIC BLOOD PRESSURE: 70 MMHG | HEIGHT: 65 IN | SYSTOLIC BLOOD PRESSURE: 122 MMHG | WEIGHT: 189.4 LBS

## 2019-07-09 DIAGNOSIS — Q81.0: Primary | ICD-10-CM

## 2019-07-09 PROCEDURE — 1036F TOBACCO NON-USER: CPT | Performed by: PHYSICIAN ASSISTANT

## 2019-07-09 PROCEDURE — 3008F BODY MASS INDEX DOCD: CPT | Performed by: PHYSICIAN ASSISTANT

## 2019-07-09 PROCEDURE — 99214 OFFICE O/P EST MOD 30 MIN: CPT | Performed by: PHYSICIAN ASSISTANT

## 2019-07-09 RX ORDER — VITAMIN B COMPLEX
1 CAPSULE ORAL DAILY
COMMUNITY

## 2019-07-09 RX ORDER — TRIAMCINOLONE ACETONIDE 1 MG/G
CREAM TOPICAL 2 TIMES DAILY
Qty: 30 G | Refills: 1 | Status: SHIPPED | OUTPATIENT
Start: 2019-07-09 | End: 2019-08-26 | Stop reason: SDUPTHER

## 2019-07-09 NOTE — PATIENT INSTRUCTIONS
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

## 2019-07-09 NOTE — ASSESSMENT & PLAN NOTE
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

## 2019-08-21 ENCOUNTER — TRANSCRIBE ORDERS (OUTPATIENT)
Dept: OBGYN CLINIC | Facility: CLINIC | Age: 64
End: 2019-08-21

## 2019-08-21 DIAGNOSIS — Z12.31 ENCOUNTER FOR SCREENING MAMMOGRAM FOR MALIGNANT NEOPLASM OF BREAST: Primary | ICD-10-CM

## 2019-08-26 DIAGNOSIS — Q81.0: ICD-10-CM

## 2019-08-26 RX ORDER — TRIAMCINOLONE ACETONIDE 1 MG/G
CREAM TOPICAL
Qty: 30 G | Refills: 1 | Status: SHIPPED | OUTPATIENT
Start: 2019-08-26

## 2019-10-09 ENCOUNTER — HOSPITAL ENCOUNTER (OUTPATIENT)
Dept: MAMMOGRAPHY | Facility: MEDICAL CENTER | Age: 64
Discharge: HOME | End: 2019-10-09
Payer: COMMERCIAL

## 2019-10-09 VITALS — WEIGHT: 189 LBS | BODY MASS INDEX: 31.49 KG/M2 | HEIGHT: 65 IN

## 2019-10-09 DIAGNOSIS — Z12.31 ENCOUNTER FOR SCREENING MAMMOGRAM FOR MALIGNANT NEOPLASM OF BREAST: ICD-10-CM

## 2019-10-09 PROCEDURE — 77063 BREAST TOMOSYNTHESIS BI: CPT

## 2019-10-09 PROCEDURE — 77067 SCR MAMMO BI INCL CAD: CPT

## 2019-12-27 ENCOUNTER — ANNUAL EXAM (OUTPATIENT)
Dept: OBGYN CLINIC | Facility: CLINIC | Age: 64
End: 2019-12-27
Payer: COMMERCIAL

## 2019-12-27 VITALS — BODY MASS INDEX: 30.22 KG/M2 | WEIGHT: 188 LBS | HEIGHT: 66 IN

## 2019-12-27 DIAGNOSIS — Z01.419 ENCOUNTER FOR ANNUAL ROUTINE GYNECOLOGICAL EXAMINATION: ICD-10-CM

## 2019-12-27 DIAGNOSIS — Z12.31 ENCOUNTER FOR SCREENING MAMMOGRAM FOR BREAST CANCER: Primary | ICD-10-CM

## 2019-12-27 PROCEDURE — S0612 ANNUAL GYNECOLOGICAL EXAMINA: HCPCS | Performed by: OBSTETRICS & GYNECOLOGY

## 2019-12-27 NOTE — PROGRESS NOTES
Assessment/Plan:    Normal breast and gyn exam    Rx for mammogram given    Subjective:      Patient ID: Chris Faye is a 59 y  o  female presents for yearly exam with no complaints  Patient denies any vaginal bleeding pelvic pain bowel or bladder issues  Status post LAVH in 1998  Status post DVT 0   Normal colonoscopy     Patient is a retired this year and has joined [employer] program    Review of Systems   Constitutional: Negative  HENT: Negative  Eyes: Negative  Respiratory: Negative  Cardiovascular: Negative  Gastrointestinal: Negative  Endocrine: Negative  Musculoskeletal: Negative  Skin: Negative  Allergic/Immunologic: Negative  Neurological: Negative  Hematological: Negative  Psychiatric/Behavioral: Negative  All other systems reviewed and are negative  Objective:      Ht 5' 6" (1 676 m)   Wt 85 3 kg (188 lb)   Breastfeeding? No   BMI 30 34 kg/m²          Physical Exam   Constitutional: She appears well-developed and well-nourished  Cardiovascular: Normal rate, regular rhythm and normal heart sounds  Pulmonary/Chest: Effort normal  No stridor  No respiratory distress  She has no wheezes  She has no rales  She exhibits no tenderness  Right breast exhibits no inverted nipple, no mass, no nipple discharge, no skin change and no tenderness  Left breast exhibits no inverted nipple, no mass, no nipple discharge, no skin change and no tenderness  No breast swelling, tenderness, discharge or bleeding  Breasts are symmetrical    Abdominal: Soft  Bowel sounds are normal  She exhibits no distension and no mass  There is no tenderness  There is no rebound and no guarding  No hernia  Hernia confirmed negative in the right inguinal area and confirmed negative in the left inguinal area     Genitourinary: Vagina normal  Rectal exam shows no external hemorrhoid, no internal hemorrhoid, no fissure, no mass, no tenderness and anal tone normal  No breast swelling, tenderness, discharge or bleeding  No labial fusion  There is no rash, tenderness, lesion or injury on the right labia  There is no rash, tenderness, lesion or injury on the left labia  Right adnexum displays no mass, no tenderness and no fullness  Left adnexum displays no mass, no tenderness and no fullness  No erythema, tenderness or bleeding in the vagina  No foreign body in the vagina  No signs of injury around the vagina  No vaginal discharge found  Genitourinary Comments: Urethral meatus normal   Cervix and uterus absent  Vaginal cuff well supported  No cystocele or rectocele noted  Lymphadenopathy: No inguinal adenopathy noted on the right or left side  Psychiatric: She has a normal mood and affect   Her behavior is normal  Judgment and thought content normal

## 2019-12-27 NOTE — PROGRESS NOTES
Pt here for yearly  Pt has no breast concerns  B&B ok        [id number]  Normal Mammo-10/9/19

## 2020-03-05 ENCOUNTER — CONSULT (OUTPATIENT)
Dept: PAIN MEDICINE | Facility: MEDICAL CENTER | Age: 65
End: 2020-03-05
Payer: COMMERCIAL

## 2020-03-05 ENCOUNTER — APPOINTMENT (OUTPATIENT)
Dept: RADIOLOGY | Facility: MEDICAL CENTER | Age: 65
End: 2020-03-05
Payer: COMMERCIAL

## 2020-03-05 VITALS
DIASTOLIC BLOOD PRESSURE: 82 MMHG | WEIGHT: 194 LBS | SYSTOLIC BLOOD PRESSURE: 119 MMHG | HEIGHT: 66 IN | HEART RATE: 77 BPM | BODY MASS INDEX: 31.18 KG/M2

## 2020-03-05 DIAGNOSIS — M54.2 NECK PAIN: Primary | ICD-10-CM

## 2020-03-05 DIAGNOSIS — M47.812 CERVICAL SPONDYLOSIS: ICD-10-CM

## 2020-03-05 DIAGNOSIS — M54.2 NECK PAIN: ICD-10-CM

## 2020-03-05 DIAGNOSIS — M79.18 MYOFASCIAL PAIN SYNDROME: ICD-10-CM

## 2020-03-05 PROCEDURE — 72050 X-RAY EXAM NECK SPINE 4/5VWS: CPT

## 2020-03-05 PROCEDURE — 1036F TOBACCO NON-USER: CPT | Performed by: PHYSICAL MEDICINE & REHABILITATION

## 2020-03-05 PROCEDURE — 99204 OFFICE O/P NEW MOD 45 MIN: CPT | Performed by: PHYSICAL MEDICINE & REHABILITATION

## 2020-03-05 PROCEDURE — 3008F BODY MASS INDEX DOCD: CPT | Performed by: PHYSICAL MEDICINE & REHABILITATION

## 2020-03-05 NOTE — PROGRESS NOTES
Assessment  1  Neck pain    2  Myofascial pain syndrome    3  Cervical spondylosis        Plan  1  Cervical spine x-ray  2  Schedule return visit for trigger point injections with nurse practitioner  3  Consider cervical medial branch nerve blocks in the future  4  Consider physical therapy  5  Continue home exercise stretching program     My impressions and treatment recommendations were discussed in detail with the patient who verbalized understanding and had no further questions  Discharge instructions were provided  I personally saw and examined the patient and I agree with the above discussed plan of care  Orders Placed This Encounter   Procedures    XR spine cervical complete 4 or 5 vw non injury     Standing Status:   Future     Standing Expiration Date:   3/5/2024     Scheduling Instructions:      Bring along any outside films relating to this procedure  Order Specific Question:   Reason for Exam:     Answer:   right sided neck pain     No orders of the defined types were placed in this encounter  History of Present Illness    Chris Faye is a 59 y o  female seen in consult as a self-referral regarding right-sided neck and shoulder myofascial pain  The patient has been experiencing the symptoms for few years and has been seen Dr Alicia Escamilla for this  He was prescribing some stretching exercises as well as performing trigger point injections providing some significant benefit  She is currently describing moderate to severe intensity pain rated as a 5/10 which is nearly constant without any typical pattern characterized as dull, aching  Aggravating factors include standing, bending, sitting, walking  She is unable to determine any significant alleviating factors other than lying down  No diagnostic imaging to review  Previous trigger point injections provided excellent relief  Moderate relief with local heat or ice application    Not currently taking any pain medications but has tried acetaminophen and ibuprofen in the past with some mild benefit  I have personally reviewed and/or updated the patient's past medical history, past surgical history, family history, social history, current medications, allergies, and vital signs today  Review of Systems   Constitutional: Negative for chills  HENT: Negative for facial swelling and postnasal drip  Eyes: Negative for photophobia  Respiratory: Negative for chest tightness  Cardiovascular: Negative for palpitations  Gastrointestinal: Negative for blood in stool  Endocrine: Negative for polyphagia  Genitourinary: Negative for genital sores and urgency  Musculoskeletal: Positive for back pain and neck pain  Negative for joint swelling (Muscle pain)  Skin: Negative for rash  Allergic/Immunologic: Negative for food allergies  Neurological: Negative for seizures  Hematological: Does not bruise/bleed easily         Patient Active Problem List   Diagnosis    Acquired deformity of joint of big toe    Depression with anxiety    GERD (gastroesophageal reflux disease)    IBS (irritable bowel syndrome)    Hallux valgus with bunions    Mixed hyperlipidemia    Neck pain    Neck muscle spasm    Overweight    Seasonal allergies    Vertigo    Vitamin D deficiency    Acute pain of left shoulder    Allergic rhinitis    Obesity    EB simplex       Past Medical History:   Diagnosis Date    History of prior pregnancies     G3G2       Past Surgical History:   Procedure Laterality Date     SECTION      HYSTERECTOMY      LAVH    MAMMO (HISTORICAL) Bilateral 2018    OTHER SURGICAL HISTORY  2012    TVTO    REDUCTION MAMMAPLASTY Bilateral     over 20yrs ago       Family History   Problem Relation Age of Onset    No Known Problems Mother     Prostate cancer Father     No Known Problems Sister     Stomach cancer Maternal Grandmother     Stomach cancer Paternal Grandmother     No Known Problems Sister Social History     Occupational History    Not on file   Tobacco Use    Smoking status: Never Smoker    Smokeless tobacco: Never Used   Substance and Sexual Activity    Alcohol use: Yes     Comment: social    Drug use: No    Sexual activity: Yes     Birth control/protection: Female Sterilization       Current Outpatient Medications on File Prior to Visit   Medication Sig    b complex vitamins capsule Take 1 capsule by mouth daily    ipratropium (ATROVENT) 0 03 % nasal spray Use 2 sprays both nostrils 3 times a day as needed    meclizine (ANTIVERT) 25 mg tablet TAKE 1 TABLET (25 MG TOTAL) BY MOUTH 3 (THREE) TIMES A DAY AS NEEDED FOR DIZZINESS   omeprazole (PRILOSEC) 20 mg delayed release capsule Take 20 mg by mouth    triamcinolone (KENALOG) 0 1 % cream APPLY TO AFFECTED AREA TWICE A DAY     No current facility-administered medications on file prior to visit  Allergies   Allergen Reactions    Metoclopramide Throat Swelling and Edema       Physical Exam    /82   Pulse 77   Ht 5' 6" (1 676 m)   Wt 88 kg (194 lb)   BMI 31 31 kg/m²     CERVICAL  General: Well-developed, well-nourished individual in no acute distress  Mental: Appropriate mood and affect  Grossly oriented with coherent speech and thought processing   Neuro:  Cranial nerves: Cranial nerve function is grossly intact bilaterally   Strength: Bilateral upper extremity strength is normal and symmetric   No atrophy or tone abnormalities noted   Reflexes: Bilateral upper extremity muscle stretch reflexes are physiologic and symmetric   No Salas sign   Sensation: No loss of sensation is noted   Gait:  Gait/gross motor: Gait is normal  Station is normal      Musculoskeletal:  Palpation: Inspection and palpation of the spine and extremities are unremarkable except for tenderness to palpation over the right trapezius and levator scapula muscles reproducing her pain complaint    Spine: Normal pain-free range of motion except for cervical rotation to the right which is limited and does reproduce some pain  No gross axial skeletal deformities   Skin: Skin inspection grossly negative for erythema, breakdown, or concerning lesions in affected area   Lymph: No lymphadenopathy is appreciated in the involved extremity   Vessels: No lower extremity edema   Lungs: Breathing is comfortable and regular  No dyspnea noted during examination   Eyes: Visual field grossly intact to confrontation  No redness appreciated  ENT: No craniofacial deformities or asymmetry  No neck masses appreciated  Imaging  No Imagine relate to this visit

## 2020-03-11 ENCOUNTER — TELEPHONE (OUTPATIENT)
Dept: PAIN MEDICINE | Facility: MEDICAL CENTER | Age: 65
End: 2020-03-11

## 2020-03-11 NOTE — TELEPHONE ENCOUNTER
----- Message from Janell Arias DO sent at 3/11/2020 12:58 PM EDT -----  Degenerative changes in cervical facet joints as expected

## 2020-08-04 ENCOUNTER — TRANSCRIBE ORDERS (OUTPATIENT)
Dept: ADMINISTRATIVE | Facility: HOSPITAL | Age: 65
End: 2020-08-04

## 2020-08-04 DIAGNOSIS — H53.121 TRANSIENT VISUAL LOSS OF RIGHT EYE: Primary | ICD-10-CM

## 2020-08-07 ENCOUNTER — TRANSCRIBE ORDERS (OUTPATIENT)
Dept: LAB | Facility: CLINIC | Age: 65
End: 2020-08-07

## 2020-08-07 ENCOUNTER — APPOINTMENT (OUTPATIENT)
Dept: LAB | Facility: CLINIC | Age: 65
End: 2020-08-07
Payer: COMMERCIAL

## 2020-08-07 DIAGNOSIS — I70.209 PERIPHERAL ARTERIOSCLEROSIS (HCC): ICD-10-CM

## 2020-08-07 DIAGNOSIS — I70.209 PERIPHERAL ARTERIOSCLEROSIS (HCC): Primary | ICD-10-CM

## 2020-08-07 LAB
BUN SERPL-MCNC: 12 MG/DL (ref 5–25)
CREAT SERPL-MCNC: 0.8 MG/DL (ref 0.6–1.3)
GFR SERPL CREATININE-BSD FRML MDRD: 78 ML/MIN/1.73SQ M

## 2020-08-07 PROCEDURE — 36415 COLL VENOUS BLD VENIPUNCTURE: CPT

## 2020-08-07 PROCEDURE — 84520 ASSAY OF UREA NITROGEN: CPT

## 2020-08-07 PROCEDURE — 82565 ASSAY OF CREATININE: CPT

## 2020-08-17 ENCOUNTER — HOSPITAL ENCOUNTER (OUTPATIENT)
Dept: MRI IMAGING | Facility: HOSPITAL | Age: 65
Discharge: HOME | End: 2020-08-17
Attending: OPHTHALMOLOGY
Payer: COMMERCIAL

## 2020-08-17 DIAGNOSIS — H53.129: ICD-10-CM

## 2020-08-17 DIAGNOSIS — H53.121 TRANSIENT VISUAL LOSS OF RIGHT EYE: ICD-10-CM

## 2020-08-17 DIAGNOSIS — H53.9 VISUAL DISORDER: ICD-10-CM

## 2020-08-17 PROCEDURE — A9585 GADOBUTROL INJECTION: HCPCS | Performed by: OPHTHALMOLOGY

## 2020-08-17 PROCEDURE — G1004 CDSM NDSC: HCPCS

## 2020-08-17 PROCEDURE — 70543 MRI ORBT/FAC/NCK W/O &W/DYE: CPT

## 2020-08-17 RX ADMIN — GADOBUTROL 9 ML: 604.72 INJECTION INTRAVENOUS at 08:58

## 2020-11-04 ENCOUNTER — TRANSCRIBE ORDERS (OUTPATIENT)
Dept: OBGYN CLINIC | Facility: CLINIC | Age: 65
End: 2020-11-04

## 2020-11-04 DIAGNOSIS — Z12.31 ENCOUNTER FOR SCREENING MAMMOGRAM FOR MALIGNANT NEOPLASM OF BREAST: Primary | ICD-10-CM

## 2020-11-09 ENCOUNTER — TRANSCRIBE ORDERS (OUTPATIENT)
Dept: ADMINISTRATIVE | Facility: HOSPITAL | Age: 65
End: 2020-11-09

## 2020-11-09 DIAGNOSIS — N64.4 BREAST PAIN, RIGHT: Primary | ICD-10-CM

## 2020-11-13 ENCOUNTER — HOSPITAL ENCOUNTER (OUTPATIENT)
Dept: ULTRASOUND IMAGING | Facility: CLINIC | Age: 65
Discharge: HOME | End: 2020-11-13
Payer: COMMERCIAL

## 2020-11-13 ENCOUNTER — HOSPITAL ENCOUNTER (OUTPATIENT)
Dept: MAMMOGRAPHY | Facility: CLINIC | Age: 65
Discharge: HOME | End: 2020-11-13
Payer: COMMERCIAL

## 2020-11-13 VITALS — TEMPERATURE: 96.6 F | HEIGHT: 66 IN | WEIGHT: 190 LBS | BODY MASS INDEX: 30.53 KG/M2

## 2020-11-13 DIAGNOSIS — N64.4 BREAST PAIN: ICD-10-CM

## 2020-11-13 DIAGNOSIS — N64.4 BREAST PAIN, RIGHT: ICD-10-CM

## 2020-11-13 PROCEDURE — 77066 DX MAMMO INCL CAD BI: CPT

## 2020-11-13 PROCEDURE — 76642 ULTRASOUND BREAST LIMITED: CPT

## 2020-11-13 PROCEDURE — G0279 TOMOSYNTHESIS, MAMMO: HCPCS

## 2020-12-28 ENCOUNTER — ANNUAL EXAM (OUTPATIENT)
Dept: OBGYN CLINIC | Facility: CLINIC | Age: 65
End: 2020-12-28
Payer: COMMERCIAL

## 2020-12-28 VITALS
DIASTOLIC BLOOD PRESSURE: 80 MMHG | HEIGHT: 66 IN | WEIGHT: 200 LBS | SYSTOLIC BLOOD PRESSURE: 124 MMHG | BODY MASS INDEX: 32.14 KG/M2

## 2020-12-28 DIAGNOSIS — N95.2 VAGINAL ATROPHY: ICD-10-CM

## 2020-12-28 DIAGNOSIS — Z12.31 ENCOUNTER FOR SCREENING MAMMOGRAM FOR BREAST CANCER: Primary | ICD-10-CM

## 2020-12-28 PROCEDURE — 99397 PER PM REEVAL EST PAT 65+ YR: CPT | Performed by: OBSTETRICS & GYNECOLOGY

## 2021-03-02 ENCOUNTER — LAB (OUTPATIENT)
Dept: LAB | Facility: CLINIC | Age: 66
End: 2021-03-02
Payer: COMMERCIAL

## 2021-03-02 ENCOUNTER — TRANSCRIBE ORDERS (OUTPATIENT)
Dept: LAB | Facility: CLINIC | Age: 66
End: 2021-03-02

## 2021-03-02 DIAGNOSIS — E78.2 MIXED HYPERLIPIDEMIA: ICD-10-CM

## 2021-03-02 DIAGNOSIS — E55.9 AVITAMINOSIS D: ICD-10-CM

## 2021-03-02 DIAGNOSIS — R53.83 FATIGUE, UNSPECIFIED TYPE: ICD-10-CM

## 2021-03-02 DIAGNOSIS — R53.83 FATIGUE, UNSPECIFIED TYPE: Primary | ICD-10-CM

## 2021-03-02 DIAGNOSIS — F41.1 GENERALIZED ANXIETY DISORDER: ICD-10-CM

## 2021-03-02 LAB
25(OH)D3 SERPL-MCNC: 26.3 NG/ML (ref 30–100)
ALBUMIN SERPL BCP-MCNC: 3.7 G/DL (ref 3.5–5)
ALP SERPL-CCNC: 72 U/L (ref 46–116)
ALT SERPL W P-5'-P-CCNC: 47 U/L (ref 12–78)
ANION GAP SERPL CALCULATED.3IONS-SCNC: 3 MMOL/L (ref 4–13)
AST SERPL W P-5'-P-CCNC: 28 U/L (ref 5–45)
BILIRUB SERPL-MCNC: 0.49 MG/DL (ref 0.2–1)
BUN SERPL-MCNC: 12 MG/DL (ref 5–25)
CALCIUM SERPL-MCNC: 9.2 MG/DL (ref 8.3–10.1)
CHLORIDE SERPL-SCNC: 109 MMOL/L (ref 100–108)
CHOLEST SERPL-MCNC: 151 MG/DL (ref 50–200)
CO2 SERPL-SCNC: 30 MMOL/L (ref 21–32)
CREAT SERPL-MCNC: 0.8 MG/DL (ref 0.6–1.3)
EOSINOPHIL NFR BLD MANUAL: 3 % (ref 0–6)
ERYTHROCYTE [DISTWIDTH] IN BLOOD BY AUTOMATED COUNT: 12.8 % (ref 11.6–15.1)
GFR SERPL CREATININE-BSD FRML MDRD: 78 ML/MIN/1.73SQ M
GLUCOSE P FAST SERPL-MCNC: 93 MG/DL (ref 65–99)
HCT VFR BLD AUTO: 43.3 % (ref 34.8–46.1)
HDLC SERPL-MCNC: 62 MG/DL
HGB BLD-MCNC: 14.1 G/DL (ref 11.5–15.4)
LDLC SERPL CALC-MCNC: 66 MG/DL (ref 0–100)
LYMPHOCYTES # BLD AUTO: 14 % (ref 14–44)
MCH RBC QN AUTO: 32.2 PG (ref 26.8–34.3)
MCHC RBC AUTO-ENTMCNC: 32.6 G/DL (ref 31.4–37.4)
MCV RBC AUTO: 99 FL (ref 82–98)
METAMYELOCYTES NFR BLD MANUAL: 1 % (ref 0–1)
MONOCYTES NFR BLD: 6 % (ref 4–12)
NEUTS BAND NFR BLD MANUAL: 1 % (ref 0–8)
NEUTS SEG NFR BLD AUTO: 55 % (ref 43–75)
NONHDLC SERPL-MCNC: 89 MG/DL
NRBC BLD AUTO-RTO: 0 /100 WBCS
PATHOLOGIST INTERPRETATION: NORMAL
PATHOLOGY REVIEW: YES
PLASMA CELLS NFR BLD: 8 % (ref 0–0)
PLATELET # BLD AUTO: 247 THOUSANDS/UL (ref 149–390)
PLATELET BLD QL SMEAR: ADEQUATE
PMV BLD AUTO: 10.7 FL (ref 8.9–12.7)
POTASSIUM SERPL-SCNC: 4 MMOL/L (ref 3.5–5.3)
PROT SERPL-MCNC: 7.2 G/DL (ref 6.4–8.2)
RBC # BLD AUTO: 4.38 MILLION/UL (ref 3.81–5.12)
SODIUM SERPL-SCNC: 142 MMOL/L (ref 136–145)
TRIGL SERPL-MCNC: 115 MG/DL
TSH SERPL DL<=0.05 MIU/L-ACNC: 2.48 UIU/ML (ref 0.36–3.74)
VARIANT LYMPHS # BLD AUTO: 1 %
WBC # BLD AUTO: 7.13 THOUSAND/UL (ref 4.31–10.16)

## 2021-03-02 PROCEDURE — 84443 ASSAY THYROID STIM HORMONE: CPT

## 2021-03-02 PROCEDURE — 85007 BL SMEAR W/DIFF WBC COUNT: CPT

## 2021-03-02 PROCEDURE — 80061 LIPID PANEL: CPT

## 2021-03-02 PROCEDURE — 85027 COMPLETE CBC AUTOMATED: CPT

## 2021-03-02 PROCEDURE — 82306 VITAMIN D 25 HYDROXY: CPT

## 2021-03-02 PROCEDURE — 80053 COMPREHEN METABOLIC PANEL: CPT

## 2021-03-02 PROCEDURE — 36415 COLL VENOUS BLD VENIPUNCTURE: CPT

## 2021-03-10 DIAGNOSIS — Z23 ENCOUNTER FOR IMMUNIZATION: ICD-10-CM

## 2021-03-16 ENCOUNTER — IMMUNIZATIONS (OUTPATIENT)
Dept: FAMILY MEDICINE CLINIC | Facility: HOSPITAL | Age: 66
End: 2021-03-16

## 2021-03-16 DIAGNOSIS — Z23 ENCOUNTER FOR IMMUNIZATION: Primary | ICD-10-CM

## 2021-03-16 PROCEDURE — 0011A SARS-COV-2 / COVID-19 MRNA VACCINE (MODERNA) 100 MCG: CPT

## 2021-03-16 PROCEDURE — 91301 SARS-COV-2 / COVID-19 MRNA VACCINE (MODERNA) 100 MCG: CPT

## 2021-04-14 ENCOUNTER — IMMUNIZATIONS (OUTPATIENT)
Dept: FAMILY MEDICINE CLINIC | Facility: HOSPITAL | Age: 66
End: 2021-04-14

## 2021-04-14 DIAGNOSIS — Z23 ENCOUNTER FOR IMMUNIZATION: Primary | ICD-10-CM

## 2021-04-14 PROCEDURE — 91301 SARS-COV-2 / COVID-19 MRNA VACCINE (MODERNA) 100 MCG: CPT

## 2021-04-14 PROCEDURE — 0012A SARS-COV-2 / COVID-19 MRNA VACCINE (MODERNA) 100 MCG: CPT

## 2021-11-02 ENCOUNTER — TELEPHONE (OUTPATIENT)
Dept: FAMILY MEDICINE CLINIC | Facility: CLINIC | Age: 66
End: 2021-11-02

## 2021-12-07 ENCOUNTER — HOSPITAL ENCOUNTER (OUTPATIENT)
Dept: MAMMOGRAPHY | Facility: MEDICAL CENTER | Age: 66
Discharge: HOME | End: 2021-12-07
Payer: COMMERCIAL

## 2021-12-07 VITALS — BODY MASS INDEX: 32.14 KG/M2 | WEIGHT: 200 LBS | HEIGHT: 66 IN

## 2021-12-07 DIAGNOSIS — Z12.31 ENCOUNTER FOR SCREENING MAMMOGRAM FOR BREAST CANCER: ICD-10-CM

## 2021-12-07 PROCEDURE — 77063 BREAST TOMOSYNTHESIS BI: CPT

## 2021-12-07 PROCEDURE — 77067 SCR MAMMO BI INCL CAD: CPT

## 2021-12-29 ENCOUNTER — ANNUAL EXAM (OUTPATIENT)
Dept: OBGYN CLINIC | Facility: CLINIC | Age: 66
End: 2021-12-29
Payer: COMMERCIAL

## 2021-12-29 VITALS
SYSTOLIC BLOOD PRESSURE: 132 MMHG | WEIGHT: 197 LBS | HEIGHT: 66 IN | DIASTOLIC BLOOD PRESSURE: 80 MMHG | BODY MASS INDEX: 31.66 KG/M2

## 2021-12-29 DIAGNOSIS — N95.2 VAGINAL ATROPHY: ICD-10-CM

## 2021-12-29 DIAGNOSIS — Z12.31 ENCOUNTER FOR SCREENING MAMMOGRAM FOR BREAST CANCER: Primary | ICD-10-CM

## 2021-12-29 DIAGNOSIS — Z01.419 ENCOUNTER FOR ANNUAL ROUTINE GYNECOLOGICAL EXAMINATION: ICD-10-CM

## 2021-12-29 PROCEDURE — 99214 OFFICE O/P EST MOD 30 MIN: CPT | Performed by: OBSTETRICS & GYNECOLOGY

## 2021-12-29 RX ORDER — CITALOPRAM 20 MG/1
TABLET ORAL
COMMUNITY
Start: 2021-10-14

## 2022-03-07 ENCOUNTER — APPOINTMENT (OUTPATIENT)
Dept: LAB | Facility: CLINIC | Age: 67
End: 2022-03-07
Payer: COMMERCIAL

## 2022-03-07 DIAGNOSIS — Z00.00 ROUTINE GENERAL MEDICAL EXAMINATION AT A HEALTH CARE FACILITY: ICD-10-CM

## 2022-03-07 DIAGNOSIS — E55.9 AVITAMINOSIS D: ICD-10-CM

## 2022-03-07 LAB
25(OH)D3 SERPL-MCNC: 39.4 NG/ML (ref 30–100)
ALBUMIN SERPL BCP-MCNC: 3.4 G/DL (ref 3.5–5)
ALP SERPL-CCNC: 68 U/L (ref 46–116)
ALT SERPL W P-5'-P-CCNC: 40 U/L (ref 12–78)
ANION GAP SERPL CALCULATED.3IONS-SCNC: 3 MMOL/L (ref 4–13)
AST SERPL W P-5'-P-CCNC: 25 U/L (ref 5–45)
BASOPHILS # BLD AUTO: 0.03 THOUSANDS/ΜL (ref 0–0.1)
BASOPHILS NFR BLD AUTO: 1 % (ref 0–1)
BILIRUB SERPL-MCNC: 0.41 MG/DL (ref 0.2–1)
BUN SERPL-MCNC: 13 MG/DL (ref 5–25)
CALCIUM ALBUM COR SERPL-MCNC: 9.8 MG/DL (ref 8.3–10.1)
CALCIUM SERPL-MCNC: 9.3 MG/DL (ref 8.3–10.1)
CHLORIDE SERPL-SCNC: 110 MMOL/L (ref 100–108)
CHOLEST SERPL-MCNC: 138 MG/DL
CO2 SERPL-SCNC: 26 MMOL/L (ref 21–32)
CREAT SERPL-MCNC: 0.83 MG/DL (ref 0.6–1.3)
EOSINOPHIL # BLD AUTO: 0.18 THOUSAND/ΜL (ref 0–0.61)
EOSINOPHIL NFR BLD AUTO: 4 % (ref 0–6)
ERYTHROCYTE [DISTWIDTH] IN BLOOD BY AUTOMATED COUNT: 12.8 % (ref 11.6–15.1)
GFR SERPL CREATININE-BSD FRML MDRD: 73 ML/MIN/1.73SQ M
GLUCOSE P FAST SERPL-MCNC: 102 MG/DL (ref 65–99)
HCT VFR BLD AUTO: 42.8 % (ref 34.8–46.1)
HDLC SERPL-MCNC: 61 MG/DL
HGB BLD-MCNC: 13.5 G/DL (ref 11.5–15.4)
IMM GRANULOCYTES # BLD AUTO: 0.03 THOUSAND/UL (ref 0–0.2)
IMM GRANULOCYTES NFR BLD AUTO: 1 % (ref 0–2)
LDLC SERPL CALC-MCNC: 57 MG/DL (ref 0–100)
LYMPHOCYTES # BLD AUTO: 1.43 THOUSANDS/ΜL (ref 0.6–4.47)
LYMPHOCYTES NFR BLD AUTO: 28 % (ref 14–44)
MCH RBC QN AUTO: 32.2 PG (ref 26.8–34.3)
MCHC RBC AUTO-ENTMCNC: 31.5 G/DL (ref 31.4–37.4)
MCV RBC AUTO: 102 FL (ref 82–98)
MONOCYTES # BLD AUTO: 0.4 THOUSAND/ΜL (ref 0.17–1.22)
MONOCYTES NFR BLD AUTO: 8 % (ref 4–12)
NEUTROPHILS # BLD AUTO: 3.12 THOUSANDS/ΜL (ref 1.85–7.62)
NEUTS SEG NFR BLD AUTO: 58 % (ref 43–75)
NONHDLC SERPL-MCNC: 77 MG/DL
NRBC BLD AUTO-RTO: 0 /100 WBCS
PLATELET # BLD AUTO: 239 THOUSANDS/UL (ref 149–390)
PMV BLD AUTO: 10.8 FL (ref 8.9–12.7)
POTASSIUM SERPL-SCNC: 4.3 MMOL/L (ref 3.5–5.3)
PROT SERPL-MCNC: 7.2 G/DL (ref 6.4–8.2)
RBC # BLD AUTO: 4.19 MILLION/UL (ref 3.81–5.12)
SODIUM SERPL-SCNC: 139 MMOL/L (ref 136–145)
TRIGL SERPL-MCNC: 99 MG/DL
TSH SERPL DL<=0.05 MIU/L-ACNC: 2.9 UIU/ML (ref 0.36–3.74)
WBC # BLD AUTO: 5.19 THOUSAND/UL (ref 4.31–10.16)

## 2022-03-07 PROCEDURE — 84443 ASSAY THYROID STIM HORMONE: CPT

## 2022-03-07 PROCEDURE — 80053 COMPREHEN METABOLIC PANEL: CPT

## 2022-03-07 PROCEDURE — 80061 LIPID PANEL: CPT

## 2022-03-07 PROCEDURE — 82306 VITAMIN D 25 HYDROXY: CPT

## 2022-03-07 PROCEDURE — 36415 COLL VENOUS BLD VENIPUNCTURE: CPT

## 2022-03-07 PROCEDURE — 85025 COMPLETE CBC W/AUTO DIFF WBC: CPT

## 2024-01-03 ENCOUNTER — HOSPITAL ENCOUNTER (OUTPATIENT)
Dept: CT IMAGING | Facility: HOSPITAL | Age: 69
Discharge: HOME | End: 2024-01-03
Payer: COMMERCIAL

## 2024-01-03 DIAGNOSIS — J32.1 CHRONIC FRONTAL SINUSITIS: ICD-10-CM

## 2024-01-03 DIAGNOSIS — G50.1 ATYPICAL FACIAL PAIN: ICD-10-CM

## 2024-01-03 DIAGNOSIS — J32.0 CHRONIC MAXILLARY SINUSITIS: ICD-10-CM

## 2024-01-03 DIAGNOSIS — J32.3 CHRONIC SPHENOIDAL SINUSITIS: ICD-10-CM

## 2024-01-03 DIAGNOSIS — J32.2 CHRONIC ETHMOIDAL SINUSITIS: ICD-10-CM

## 2024-01-03 DIAGNOSIS — R09.81 NASAL CONGESTION: ICD-10-CM

## 2024-01-03 PROCEDURE — G1004 CDSM NDSC: HCPCS

## 2024-01-03 PROCEDURE — 70486 CT MAXILLOFACIAL W/O DYE: CPT

## 2024-01-04 ENCOUNTER — ANNUAL EXAM (OUTPATIENT)
Dept: GYNECOLOGY | Facility: CLINIC | Age: 69
End: 2024-01-04
Payer: COMMERCIAL

## 2024-01-04 VITALS
WEIGHT: 199 LBS | SYSTOLIC BLOOD PRESSURE: 124 MMHG | DIASTOLIC BLOOD PRESSURE: 80 MMHG | BODY MASS INDEX: 31.98 KG/M2 | HEIGHT: 66 IN

## 2024-01-04 DIAGNOSIS — Z01.411 ENCOUNTER FOR GYNECOLOGICAL EXAMINATION WITH ABNORMAL FINDING: ICD-10-CM

## 2024-01-04 DIAGNOSIS — N95.2 VAGINAL ATROPHY: ICD-10-CM

## 2024-01-04 DIAGNOSIS — Z12.31 SCREENING MAMMOGRAM FOR BREAST CANCER: Primary | ICD-10-CM

## 2024-01-04 DIAGNOSIS — Z78.0 POSTMENOPAUSAL: ICD-10-CM

## 2024-01-04 PROCEDURE — G0101 CA SCREEN;PELVIC/BREAST EXAM: HCPCS | Performed by: OBSTETRICS & GYNECOLOGY

## 2024-01-04 NOTE — PROGRESS NOTES
"Assessment/Plan:  Normal breast exam  Vaginal atrophy  Normal mammogram 2021  Hysterectomy  Normal colonoscopy     End: Rx mammogram.  Rx DEXA scan.  Recommend healthy diet and exercise.    Subjective:       Patient ID: Kari Saleh is a 68 y.o. female presents to the office for annual exam with no complaints.  Status post hysterectomy.  Denies any pelvic pain vaginal bleeding or dyspareunia.  Denies any breast bowel or bladder problems.  No change in family history.  Medications reviewed.  Retired and keeping active.        Review of Systems   Constitutional: Negative.  Negative for fatigue, fever and unexpected weight change.   HENT: Negative.     Eyes: Negative.    Respiratory: Negative.  Negative for chest tightness, shortness of breath, wheezing and stridor.    Cardiovascular: Negative.  Negative for chest pain, palpitations and leg swelling.   Gastrointestinal: Negative.  Negative for abdominal pain, blood in stool, diarrhea, nausea, rectal pain and vomiting.   Endocrine: Negative.    Genitourinary:  Negative for dysuria, frequency, vaginal bleeding, vaginal discharge and vaginal pain.   Musculoskeletal: Negative.    Skin: Negative.    Allergic/Immunologic: Negative.    Neurological: Negative.    Hematological: Negative.    Psychiatric/Behavioral: Negative.     All other systems reviewed and are negative.        Objective:      /80   Ht 5' 5.5\" (1.664 m)   Wt 90.3 kg (199 lb)   BMI 32.61 kg/m²          Physical Exam  Constitutional:       Appearance: She is well-developed.   Cardiovascular:      Rate and Rhythm: Normal rate and regular rhythm.      Heart sounds: Normal heart sounds.   Pulmonary:      Effort: Pulmonary effort is normal. No respiratory distress.      Breath sounds: No stridor. No wheezing or rales.   Chest:      Chest wall: No tenderness.   Breasts:     Breasts are symmetrical.      Right: No inverted nipple, mass, nipple discharge, skin change or tenderness. "      Left: No inverted nipple, mass, nipple discharge, skin change or tenderness.   Abdominal:      General: Bowel sounds are normal. There is no distension.      Palpations: Abdomen is soft. There is no mass.      Tenderness: There is no abdominal tenderness. There is no guarding or rebound.      Hernia: No hernia is present. There is no hernia in the left inguinal area.   Genitourinary:     Labia:         Right: No rash, tenderness, lesion or injury.         Left: No rash, tenderness, lesion or injury.       Vagina: Normal. No signs of injury and foreign body. No vaginal discharge, erythema, tenderness or bleeding.      Adnexa:         Right: No mass, tenderness or fullness.          Left: No mass, tenderness or fullness.        Rectum: No mass, tenderness, anal fissure, external hemorrhoid or internal hemorrhoid. Normal anal tone.      Comments: Urethral meatus normal.  Normal [place]'s glands.  Cervix and uterus absent.  Vaginal cuff well supported.  No cystocele or rectocele.  Mild vaginal atrophy.  Lymphadenopathy:      Lower Body: No right inguinal adenopathy. No left inguinal adenopathy.   Psychiatric:         Behavior: Behavior normal.         Thought Content: Thought content normal.         Judgment: Judgment normal.

## 2024-02-15 ENCOUNTER — HOSPITAL ENCOUNTER (OUTPATIENT)
Dept: MAMMOGRAPHY | Facility: MEDICAL CENTER | Age: 69
Discharge: HOME | End: 2024-02-15
Payer: COMMERCIAL

## 2024-02-15 VITALS — WEIGHT: 199 LBS | BODY MASS INDEX: 31.98 KG/M2 | HEIGHT: 66 IN

## 2024-02-15 DIAGNOSIS — Z12.31 SCREENING MAMMOGRAM FOR BREAST CANCER: ICD-10-CM

## 2024-02-15 PROCEDURE — 77063 BREAST TOMOSYNTHESIS BI: CPT

## 2024-02-15 PROCEDURE — 77067 SCR MAMMO BI INCL CAD: CPT

## 2024-03-13 ENCOUNTER — HOSPITAL ENCOUNTER (OUTPATIENT)
Dept: BONE DENSITY | Facility: MEDICAL CENTER | Age: 69
Discharge: HOME | End: 2024-03-13
Payer: COMMERCIAL

## 2024-03-13 DIAGNOSIS — Z78.0 POSTMENOPAUSAL: ICD-10-CM

## 2024-03-13 PROCEDURE — 77080 DXA BONE DENSITY AXIAL: CPT

## 2025-04-30 ENCOUNTER — NURSE TRIAGE (OUTPATIENT)
Age: 70
End: 2025-04-30

## 2025-04-30 ENCOUNTER — TELEPHONE (OUTPATIENT)
Dept: MAMMOGRAPHY | Facility: CLINIC | Age: 70
End: 2025-04-30

## 2025-04-30 NOTE — TELEPHONE ENCOUNTER
"FOLLOW UP: Patient requesting order for mammogram to assess breast pain.     REASON FOR CONVERSATION: Breast Pain    SYMPTOMS: Left breast pain that started about 2 weeks ago.    OTHER: Denies skin changes, nipple changes, nipple discharge, masses. Last screening mammogram 2/15/24.     DISPOSITION: Discuss with Provider and Call Back Patient      Answer Assessment - Initial Assessment Questions  1. SYMPTOM: \"What's the main symptom you're concerned about?\"  (e.g., lump, nipple discharge, pain, rash )      Left breast pain started 2 weeks ago  2. LOCATION: \"Where is the pain located?\"      Left breast  3. ONSET: \"When did symptoms  start?\"      2 weeks ago  4. PRIOR HISTORY: \"Do you have any history of prior problems with your breasts?\" (e.g., breast cancer, breast implant, fibrocystic breast disease)      denies  5. CAUSE: \"What do you think is causing this symptom?\"      unsure  6. OTHER SYMPTOMS: \"Do you have any other symptoms?\" (e.g., fever, breast pain, nipple discharge, redness or rash)      denies  7. PREGNANCY-BREASTFEEDING: \"Is there any chance you are pregnant?\" \"When was your last menstrual period?\" \"Are you breastfeeding?\"      Denies    Protocols used: Breast Symptoms-Adult-[place]    "